# Patient Record
Sex: MALE | Race: WHITE | NOT HISPANIC OR LATINO | URBAN - METROPOLITAN AREA
[De-identification: names, ages, dates, MRNs, and addresses within clinical notes are randomized per-mention and may not be internally consistent; named-entity substitution may affect disease eponyms.]

---

## 2017-10-10 ENCOUNTER — APPOINTMENT (RX ONLY)
Dept: URBAN - METROPOLITAN AREA CLINIC 349 | Facility: CLINIC | Age: 82
Setting detail: DERMATOLOGY
End: 2017-10-10

## 2017-10-10 DIAGNOSIS — L82.0 INFLAMED SEBORRHEIC KERATOSIS: ICD-10-CM

## 2017-10-10 DIAGNOSIS — L30.9 DERMATITIS, UNSPECIFIED: ICD-10-CM

## 2017-10-10 PROBLEM — M12.9 ARTHROPATHY, UNSPECIFIED: Status: ACTIVE | Noted: 2017-10-10

## 2017-10-10 PROBLEM — C43.4 MALIGNANT MELANOMA OF SCALP AND NECK: Status: ACTIVE | Noted: 2017-10-10

## 2017-10-10 PROBLEM — I10 ESSENTIAL (PRIMARY) HYPERTENSION: Status: ACTIVE | Noted: 2017-10-10

## 2017-10-10 PROCEDURE — 11100: CPT | Mod: 59

## 2017-10-10 PROCEDURE — A4550 SURGICAL TRAYS: HCPCS

## 2017-10-10 PROCEDURE — ? SHAVE REMOVAL

## 2017-10-10 PROCEDURE — 11301 SHAVE SKIN LESION 0.6-1.0 CM: CPT

## 2017-10-10 PROCEDURE — ? PRESCRIPTION

## 2017-10-10 PROCEDURE — ? COUNSELING

## 2017-10-10 PROCEDURE — 99213 OFFICE O/P EST LOW 20 MIN: CPT | Mod: 25

## 2017-10-10 PROCEDURE — ? BIOPSY BY PUNCH METHOD

## 2017-10-10 RX ORDER — FLUTICASONE PROPIONATE 0.05 MG/G
OINTMENT TOPICAL
Qty: 1 | Refills: 0 | Status: ERX | COMMUNITY
Start: 2017-10-10

## 2017-10-10 RX ORDER — TRIAMCINOLONE ACETONIDE 1 MG/G
CREAM TOPICAL
Qty: 1 | Refills: 2 | Status: ERX

## 2017-10-10 RX ADMIN — FLUTICASONE PROPIONATE: 0.05 OINTMENT TOPICAL at 00:00

## 2017-10-10 ASSESSMENT — LOCATION DETAILED DESCRIPTION DERM
LOCATION DETAILED: RIGHT LATERAL TRAPEZIAL NECK
LOCATION DETAILED: LEFT INFERIOR UPPER BACK
LOCATION DETAILED: LEFT SUPERIOR MEDIAL MIDBACK

## 2017-10-10 ASSESSMENT — LOCATION SIMPLE DESCRIPTION DERM
LOCATION SIMPLE: POSTERIOR NECK
LOCATION SIMPLE: LEFT LOWER BACK
LOCATION SIMPLE: LEFT UPPER BACK

## 2017-10-10 ASSESSMENT — LOCATION ZONE DERM
LOCATION ZONE: TRUNK
LOCATION ZONE: NECK

## 2017-10-10 NOTE — PROCEDURE: BIOPSY BY PUNCH METHOD
Epidermal Sutures: 5-0 Nylon
Hemostasis: None
Detail Level: Detailed
Anesthesia Type: 1% lidocaine with epinephrine
Billing Type: Third-Party Bill
X Size Of Lesion In Cm (Optional): 0
Bill For Surgical Tray: yes
Patient Will Remove Sutures At Home?: No
Consent: Written consent was obtained and risks were reviewed including but not limited to scarring, infection, bleeding, scabbing, incomplete removal, nerve damage and allergy to anesthesia.
Suture Removal: 14 days
Dressing: bandage
Punch Size In Mm: 4
Deep Sutures: 3-0 Vicryl
Additional Anesthesia Type: 0.5% lidocaine with 1:200,000 epinephrine
Notification Instructions: Patient will be notified of biopsy results. However, patient instructed to call the office if not contacted within 2 weeks.
Anesthesia Volume In Cc (Will Not Render If 0): 1
Biopsy Type: H and E
Accession #: [email or web address]
Post-Care Instructions: I reviewed with the patient in detail post-care instructions. Patient is to keep the biopsy site dry overnight, and then apply bacitracin twice daily until healed. Patient may apply hydrogen peroxide soaks to remove any crusting.
Wound Care: Vaseline

## 2017-10-10 NOTE — PROCEDURE: SHAVE REMOVAL
Anesthesia Type: 2% lidocaine with epinephrine
Medical Necessity Clause: This procedure was medically necessary because the lesion that was treated was: changing
X Size Of Lesion In Cm (Optional): 0
Wound Care: Vaseline
Accession #: [email or web address] read
Biopsy Method: Dermablade
Billing Type: Third-Party Bill
Consent was obtained from the patient. The risks and benefits to therapy were discussed in detail. Specifically, the risks of infection, scarring, bleeding, prolonged wound healing, incomplete removal, allergy to anesthesia, nerve injury and recurrence were addressed. Prior to the procedure, the treatment site was clearly identified and confirmed by the patient. All components of Universal Protocol/PAUSE Rule completed.
Bill For Surgical Tray: yes
Medical Necessity Information: It is in your best interest to select a reason for this procedure from the list below. All of these items fulfill various CMS LCD requirements except the new and changing color options.
Anesthesia Volume In Cc: 0.5
Detail Level: Detailed
Size Of Lesion In Cm (Required): 0.8
Post-Care Instructions: I reviewed with the patient in detail post-care instructions. Patient is to keep the biopsy site dry overnight, and then apply vaseline twice daily until healed. Patient may apply hydrogen peroxide soaks to remove any crusting. After the procedure, the patient was observed for 5-10 minutes and was oriented to person, place and time and demied feeling dizzy, queasy, and stated that they did not feel that they were going to faint.
Bill 23271 For Specimen Handling/Conveyance To Laboratory?: no
Size Of Margin In Cm (Margins Are Not Added To Billing Dimensions): -
Hemostasis: Electrocautery
Path Notes (To The Dermatopathologist): Please check margins.
Notification Instructions: Patient will be notified of biopsy results. However, patient instructed to call the office if not contacted within 2 weeks.

## 2017-10-24 ENCOUNTER — APPOINTMENT (RX ONLY)
Dept: URBAN - METROPOLITAN AREA CLINIC 349 | Facility: CLINIC | Age: 82
Setting detail: DERMATOLOGY
End: 2017-10-24

## 2017-10-24 ENCOUNTER — RX ONLY (OUTPATIENT)
Age: 82
Setting detail: RX ONLY
End: 2017-10-24

## 2017-10-24 DIAGNOSIS — L30.9 DERMATITIS, UNSPECIFIED: ICD-10-CM | Status: IMPROVED

## 2017-10-24 DIAGNOSIS — Z85.820 PERSONAL HISTORY OF MALIGNANT MELANOMA OF SKIN: ICD-10-CM

## 2017-10-24 PROCEDURE — ? PRESCRIPTION

## 2017-10-24 PROCEDURE — 99213 OFFICE O/P EST LOW 20 MIN: CPT

## 2017-10-24 PROCEDURE — ? SUTURE REMOVAL (GLOBAL PERIOD)

## 2017-10-24 PROCEDURE — ? COUNSELING

## 2017-10-24 PROCEDURE — 99024 POSTOP FOLLOW-UP VISIT: CPT

## 2017-10-24 RX ORDER — FLUTICASONE PROPIONATE 0.05 MG/G
OINTMENT TOPICAL
Qty: 1 | Refills: 0 | Status: ERX

## 2017-10-24 RX ORDER — TRIAMCINOLONE ACETONIDE 1 MG/G
CREAM TOPICAL
Qty: 1 | Refills: 2 | Status: ERX

## 2017-10-24 ASSESSMENT — LOCATION SIMPLE DESCRIPTION DERM
LOCATION SIMPLE: RIGHT FOREHEAD
LOCATION SIMPLE: RIGHT SHOULDER
LOCATION SIMPLE: RIGHT SCALP

## 2017-10-24 ASSESSMENT — LOCATION ZONE DERM
LOCATION ZONE: SCALP
LOCATION ZONE: ARM
LOCATION ZONE: FACE

## 2017-10-24 ASSESSMENT — LOCATION DETAILED DESCRIPTION DERM
LOCATION DETAILED: RIGHT CENTRAL FRONTAL SCALP
LOCATION DETAILED: RIGHT SUPERIOR FOREHEAD
LOCATION DETAILED: RIGHT POSTERIOR SHOULDER

## 2017-10-24 NOTE — PROCEDURE: REASSURANCE
Quality 224: Stage 0-Iic Melanoma: Overutilization Of Imaging Studies For Only Stage 0-Iic Melanoma: None of the following diagnostic imaging studies ordered: chest X-ray, CT, Ultrasound, MRI, PET, or nuclear medicine scans (ML)
Quality 137: Melanoma: Continuity Of Care - Recall System: Recall system not utilized, reason not otherwise specified
Detail Level: Zone

## 2017-10-24 NOTE — PROCEDURE: COUNSELING
Quality 137: Melanoma: Continuity Of Care - Recall System: Recall system not utilized, reason not otherwise specified
Detail Level: Generalized
Quality 224: Stage 0-Iic Melanoma: Overutilization Of Imaging Studies For Only Stage 0-Iic Melanoma: None of the following diagnostic imaging studies ordered: chest X-ray, CT, Ultrasound, MRI, PET, or nuclear medicine scans (ML)

## 2017-10-24 NOTE — PROCEDURE: SUTURE REMOVAL (GLOBAL PERIOD)
Add 84123 Cpt? (Important Note: In 2017 The Use Of 29674 Is Being Tracked By Cms To Determine Future Global Period Reimbursement For Global Periods): yes
Detail Level: Detailed

## 2017-12-04 ENCOUNTER — APPOINTMENT (RX ONLY)
Dept: URBAN - METROPOLITAN AREA CLINIC 349 | Facility: CLINIC | Age: 82
Setting detail: DERMATOLOGY
End: 2017-12-04

## 2017-12-04 DIAGNOSIS — L30.9 DERMATITIS, UNSPECIFIED: ICD-10-CM | Status: INADEQUATELY CONTROLLED

## 2017-12-04 DIAGNOSIS — Z85.820 PERSONAL HISTORY OF MALIGNANT MELANOMA OF SKIN: ICD-10-CM

## 2017-12-04 PROCEDURE — ? COUNSELING

## 2017-12-04 PROCEDURE — 99213 OFFICE O/P EST LOW 20 MIN: CPT | Mod: 25

## 2017-12-04 PROCEDURE — ? OTHER

## 2017-12-04 PROCEDURE — ? PRESCRIPTION

## 2017-12-04 PROCEDURE — ? INTRAMUSCULAR KENALOG

## 2017-12-04 PROCEDURE — 96372 THER/PROPH/DIAG INJ SC/IM: CPT

## 2017-12-04 RX ORDER — TRIAMCINOLONE ACETONIDE 1 MG/G
CREAM TOPICAL
Qty: 1 | Refills: 2 | Status: ERX

## 2017-12-04 RX ORDER — FLUTICASONE PROPIONATE 0.05 MG/G
OINTMENT TOPICAL
Qty: 1 | Refills: 0 | Status: ERX

## 2017-12-04 ASSESSMENT — LOCATION ZONE DERM
LOCATION ZONE: SCALP
LOCATION ZONE: TRUNK

## 2017-12-04 ASSESSMENT — LOCATION DETAILED DESCRIPTION DERM
LOCATION DETAILED: RIGHT CENTRAL FRONTAL SCALP
LOCATION DETAILED: RIGHT BUTTOCK

## 2017-12-04 ASSESSMENT — LOCATION SIMPLE DESCRIPTION DERM
LOCATION SIMPLE: RIGHT BUTTOCK
LOCATION SIMPLE: RIGHT SCALP

## 2017-12-04 NOTE — PROCEDURE: OTHER
Other (Free Text): Sampled Aveeno blue label ointment and Cetaphil Restoreaderm wash
Detail Level: Zone
Note Text (......Xxx Chief Complaint.): This diagnosis correlates with the

## 2017-12-04 NOTE — PROCEDURE: REASSURANCE
Detail Level: Detailed
Quality 224: Stage 0-Iic Melanoma: Overutilization Of Imaging Studies For Only Stage 0-Iic Melanoma: None of the following diagnostic imaging studies ordered: chest X-ray, CT, Ultrasound, MRI, PET, or nuclear medicine scans (ML)
Quality 137: Melanoma: Continuity Of Care - Recall System: Recall system not utilized, reason not otherwise specified

## 2017-12-04 NOTE — PROCEDURE: COUNSELING
Detail Level: Zone
Quality 137: Melanoma: Continuity Of Care - Recall System: Recall system not utilized, reason not otherwise specified
Detail Level: Generalized

## 2017-12-07 PROBLEM — L50.8 CHRONIC URTICARIA: Status: ACTIVE | Noted: 2017-12-07

## 2018-01-05 ENCOUNTER — APPOINTMENT (RX ONLY)
Dept: URBAN - METROPOLITAN AREA CLINIC 349 | Facility: CLINIC | Age: 83
Setting detail: DERMATOLOGY
End: 2018-01-05

## 2018-01-05 DIAGNOSIS — L30.9 DERMATITIS, UNSPECIFIED: ICD-10-CM | Status: STABLE

## 2018-01-05 DIAGNOSIS — Z85.820 PERSONAL HISTORY OF MALIGNANT MELANOMA OF SKIN: ICD-10-CM

## 2018-01-05 PROBLEM — I10 ESSENTIAL (PRIMARY) HYPERTENSION: Status: ACTIVE | Noted: 2018-01-05

## 2018-01-05 PROBLEM — M12.9 ARTHROPATHY, UNSPECIFIED: Status: ACTIVE | Noted: 2018-01-05

## 2018-01-05 PROCEDURE — 99214 OFFICE O/P EST MOD 30 MIN: CPT | Mod: 25

## 2018-01-05 PROCEDURE — ? OTHER

## 2018-01-05 PROCEDURE — ? INTRAMUSCULAR KENALOG

## 2018-01-05 PROCEDURE — ? COUNSELING

## 2018-01-05 PROCEDURE — ? RECOMMENDATIONS

## 2018-01-05 PROCEDURE — 96372 THER/PROPH/DIAG INJ SC/IM: CPT

## 2018-01-05 ASSESSMENT — LOCATION DETAILED DESCRIPTION DERM
LOCATION DETAILED: RIGHT BUTTOCK
LOCATION DETAILED: INFERIOR THORACIC SPINE
LOCATION DETAILED: LEFT ANTERIOR PROXIMAL UPPER ARM
LOCATION DETAILED: RIGHT ANTERIOR PROXIMAL THIGH
LOCATION DETAILED: RIGHT CENTRAL FRONTAL SCALP
LOCATION DETAILED: PERIUMBILICAL SKIN
LOCATION DETAILED: RIGHT ANTERIOR PROXIMAL UPPER ARM
LOCATION DETAILED: LEFT INFERIOR LATERAL LOWER BACK
LOCATION DETAILED: LEFT ANTERIOR PROXIMAL THIGH

## 2018-01-05 ASSESSMENT — LOCATION SIMPLE DESCRIPTION DERM
LOCATION SIMPLE: ABDOMEN
LOCATION SIMPLE: LEFT THIGH
LOCATION SIMPLE: RIGHT SCALP
LOCATION SIMPLE: UPPER BACK
LOCATION SIMPLE: RIGHT BUTTOCK
LOCATION SIMPLE: LEFT UPPER ARM
LOCATION SIMPLE: LEFT LOWER BACK
LOCATION SIMPLE: RIGHT THIGH
LOCATION SIMPLE: RIGHT UPPER ARM

## 2018-01-05 ASSESSMENT — LOCATION ZONE DERM
LOCATION ZONE: SCALP
LOCATION ZONE: ARM
LOCATION ZONE: LEG
LOCATION ZONE: TRUNK

## 2018-01-05 NOTE — PROCEDURE: REASSURANCE
Quality 137: Melanoma: Continuity Of Care - Recall System: Recall system not utilized, reason not otherwise specified
Detail Level: Detailed
Quality 224: Stage 0-Iic Melanoma: Overutilization Of Imaging Studies For Only Stage 0-Iic Melanoma: None of the following diagnostic imaging studies ordered: chest X-ray, CT, Ultrasound, MRI, PET, or nuclear medicine scans (ML)

## 2018-01-05 NOTE — PROCEDURE: RECOMMENDATIONS
Detail Level: Zone
Recommendations (Free Text): Continue triamcinolone cream twice daily to affected area \\nFluticasone ointment to worst patches.

## 2018-03-05 ENCOUNTER — APPOINTMENT (RX ONLY)
Dept: URBAN - METROPOLITAN AREA CLINIC 349 | Facility: CLINIC | Age: 83
Setting detail: DERMATOLOGY
End: 2018-03-05

## 2018-03-05 DIAGNOSIS — Z85.820 PERSONAL HISTORY OF MALIGNANT MELANOMA OF SKIN: ICD-10-CM

## 2018-03-05 DIAGNOSIS — D18.0 HEMANGIOMA: ICD-10-CM

## 2018-03-05 DIAGNOSIS — L30.9 DERMATITIS, UNSPECIFIED: ICD-10-CM | Status: UNCHANGED

## 2018-03-05 DIAGNOSIS — L82.1 OTHER SEBORRHEIC KERATOSIS: ICD-10-CM

## 2018-03-05 PROBLEM — Z85.46 PERSONAL HISTORY OF MALIGNANT NEOPLASM OF PROSTATE: Status: ACTIVE | Noted: 2018-03-05

## 2018-03-05 PROBLEM — H91.90 UNSPECIFIED HEARING LOSS, UNSPECIFIED EAR: Status: ACTIVE | Noted: 2018-03-05

## 2018-03-05 PROBLEM — L85.3 XEROSIS CUTIS: Status: ACTIVE | Noted: 2018-03-05

## 2018-03-05 PROBLEM — D18.01 HEMANGIOMA OF SKIN AND SUBCUTANEOUS TISSUE: Status: ACTIVE | Noted: 2018-03-05

## 2018-03-05 PROCEDURE — ? TREATMENT REGIMEN

## 2018-03-05 PROCEDURE — ? COUNSELING

## 2018-03-05 PROCEDURE — ? INTRAMUSCULAR KENALOG

## 2018-03-05 PROCEDURE — 99214 OFFICE O/P EST MOD 30 MIN: CPT | Mod: 25

## 2018-03-05 PROCEDURE — 96372 THER/PROPH/DIAG INJ SC/IM: CPT

## 2018-03-05 ASSESSMENT — PAIN INTENSITY VAS: HOW INTENSE IS YOUR PAIN 0 BEING NO PAIN, 10 BEING THE MOST SEVERE PAIN POSSIBLE?: NO PAIN

## 2018-03-05 ASSESSMENT — LOCATION DETAILED DESCRIPTION DERM
LOCATION DETAILED: LEFT SUPERIOR UPPER BACK
LOCATION DETAILED: RIGHT LATERAL FOREHEAD
LOCATION DETAILED: LEFT INFERIOR MEDIAL MIDBACK
LOCATION DETAILED: LEFT MEDIAL INFERIOR CHEST
LOCATION DETAILED: LEFT INFERIOR CENTRAL MALAR CHEEK
LOCATION DETAILED: RIGHT CENTRAL FRONTAL SCALP
LOCATION DETAILED: RIGHT LATERAL BUTTOCK
LOCATION DETAILED: LEFT MEDIAL SUPERIOR CHEST
LOCATION DETAILED: PERIUMBILICAL SKIN
LOCATION DETAILED: LEFT INFERIOR LATERAL FOREHEAD

## 2018-03-05 ASSESSMENT — LOCATION ZONE DERM
LOCATION ZONE: FACE
LOCATION ZONE: TRUNK
LOCATION ZONE: SCALP

## 2018-03-05 ASSESSMENT — LOCATION SIMPLE DESCRIPTION DERM
LOCATION SIMPLE: ABDOMEN
LOCATION SIMPLE: RIGHT SCALP
LOCATION SIMPLE: RIGHT FOREHEAD
LOCATION SIMPLE: RIGHT BUTTOCK
LOCATION SIMPLE: LEFT CHEEK
LOCATION SIMPLE: LEFT FOREHEAD
LOCATION SIMPLE: LEFT LOWER BACK
LOCATION SIMPLE: CHEST
LOCATION SIMPLE: LEFT UPPER BACK

## 2018-03-05 NOTE — PROCEDURE: REASSURANCE
Quality 224: Stage 0-Iic Melanoma: Overutilization Of Imaging Studies For Only Stage 0-Iic Melanoma: None of the following diagnostic imaging studies ordered: chest X-ray, CT, Ultrasound, MRI, PET, or nuclear medicine scans (ML)
Quality 137: Melanoma: Continuity Of Care - Recall System: Recall system not utilized, reason not otherwise specified
Detail Level: Detailed

## 2018-05-07 ENCOUNTER — APPOINTMENT (RX ONLY)
Dept: URBAN - METROPOLITAN AREA CLINIC 349 | Facility: CLINIC | Age: 83
Setting detail: DERMATOLOGY
End: 2018-05-07

## 2018-05-07 DIAGNOSIS — L30.9 DERMATITIS, UNSPECIFIED: ICD-10-CM | Status: INADEQUATELY CONTROLLED

## 2018-05-07 DIAGNOSIS — L82.1 OTHER SEBORRHEIC KERATOSIS: ICD-10-CM

## 2018-05-07 DIAGNOSIS — D18.0 HEMANGIOMA: ICD-10-CM

## 2018-05-07 DIAGNOSIS — Z85.820 PERSONAL HISTORY OF MALIGNANT MELANOMA OF SKIN: ICD-10-CM

## 2018-05-07 DIAGNOSIS — L82.0 INFLAMED SEBORRHEIC KERATOSIS: ICD-10-CM

## 2018-05-07 PROBLEM — D18.01 HEMANGIOMA OF SKIN AND SUBCUTANEOUS TISSUE: Status: ACTIVE | Noted: 2018-05-07

## 2018-05-07 PROBLEM — C44.311 BASAL CELL CARCINOMA OF SKIN OF NOSE: Status: ACTIVE | Noted: 2018-05-07

## 2018-05-07 PROCEDURE — 17281 DSTR MAL LS F/E/E/N/L/M .6-1: CPT | Mod: 59,76

## 2018-05-07 PROCEDURE — ? PATHOLOGY BILLING

## 2018-05-07 PROCEDURE — ? COUNSELING

## 2018-05-07 PROCEDURE — 17281 DSTR MAL LS F/E/E/N/L/M .6-1: CPT | Mod: 59

## 2018-05-07 PROCEDURE — ? BENIGN DESTRUCTION

## 2018-05-07 PROCEDURE — ? LIQUID NITROGEN

## 2018-05-07 PROCEDURE — A4550 SURGICAL TRAYS: HCPCS

## 2018-05-07 PROCEDURE — ? TREATMENT REGIMEN

## 2018-05-07 PROCEDURE — 88305 TISSUE EXAM BY PATHOLOGIST: CPT

## 2018-05-07 PROCEDURE — 17110 DESTRUCTION B9 LES UP TO 14: CPT

## 2018-05-07 PROCEDURE — ? INTRAMUSCULAR KENALOG

## 2018-05-07 PROCEDURE — 96372 THER/PROPH/DIAG INJ SC/IM: CPT | Mod: 59

## 2018-05-07 PROCEDURE — ? OTHER

## 2018-05-07 PROCEDURE — ? SHAVE REMOVAL AND DESTRUCTION

## 2018-05-07 ASSESSMENT — LOCATION DETAILED DESCRIPTION DERM
LOCATION DETAILED: RIGHT CENTRAL EYEBROW
LOCATION DETAILED: RIGHT CENTRAL EYEBROW
LOCATION DETAILED: RIGHT FOREHEAD
LOCATION DETAILED: NASAL DORSUM
LOCATION DETAILED: RIGHT CENTRAL FRONTAL SCALP
LOCATION DETAILED: LEFT MEDIAL INFERIOR CHEST
LOCATION DETAILED: RIGHT LATERAL SUPERIOR TARSAL REGION
LOCATION DETAILED: LEFT MEDIAL SUPERIOR CHEST
LOCATION DETAILED: LEFT INFERIOR MEDIAL MIDBACK
LOCATION DETAILED: LEFT INFERIOR CENTRAL MALAR CHEEK
LOCATION DETAILED: RIGHT LATERAL FOREHEAD
LOCATION DETAILED: LEFT INFERIOR LATERAL FOREHEAD
LOCATION DETAILED: LEFT CENTRAL FRONTAL SCALP
LOCATION DETAILED: LEFT BUTTOCK
LOCATION DETAILED: RIGHT FOREHEAD
LOCATION DETAILED: PERIUMBILICAL SKIN
LOCATION DETAILED: LEFT SUPERIOR UPPER BACK
LOCATION DETAILED: LEFT LATERAL FOREHEAD

## 2018-05-07 ASSESSMENT — LOCATION SIMPLE DESCRIPTION DERM
LOCATION SIMPLE: NOSE
LOCATION SIMPLE: LEFT LOWER BACK
LOCATION SIMPLE: LEFT SCALP
LOCATION SIMPLE: RIGHT EYEBROW
LOCATION SIMPLE: ABDOMEN
LOCATION SIMPLE: RIGHT FOREHEAD
LOCATION SIMPLE: RIGHT SCALP
LOCATION SIMPLE: LEFT BUTTOCK
LOCATION SIMPLE: CHEST
LOCATION SIMPLE: LEFT CHEEK
LOCATION SIMPLE: RIGHT LATERAL SUPERIOR TARSAL REGION
LOCATION SIMPLE: RIGHT FOREHEAD
LOCATION SIMPLE: RIGHT EYEBROW
LOCATION SIMPLE: LEFT UPPER BACK
LOCATION SIMPLE: LEFT FOREHEAD

## 2018-05-07 ASSESSMENT — LOCATION ZONE DERM
LOCATION ZONE: FACE
LOCATION ZONE: EYELID
LOCATION ZONE: TRUNK
LOCATION ZONE: NOSE
LOCATION ZONE: SCALP
LOCATION ZONE: FACE

## 2018-05-07 NOTE — PROCEDURE: SHAVE REMOVAL AND DESTRUCTION
Consent: Written consent was obtained and risks were reviewed including but not limited to scarring, infection, bleeding, scabbing, incomplete removal, nerve damage and allergy to anesthesia.
Bill For Surgical Tray: yes
Bill As?: Note: Bill Malignant Destruction If Path Confirms Malignant Lesion. Only Bill As Shave Removal If Path Comes Back Benign. Do Not Bill Shave Removal On Malignant Lesions.: Malignant Destruction
Bill 94873 For Specimen Handling/Conveyance To Laboratory?: no
Number Of Curettages: 2
Dressing: Band-Aid
Accession #: [email or web address]
Size After Destruction (Required For Destruction Billing): 0.8
Detail Level: Simple
Anesthesia Type: 1% lidocaine with epinephrine
Post-Care Instructions: I reviewed with the patient in detail post-care instructions. Patient is to keep the biopsy site dry overnight, and then apply bacitracin twice daily until healed. Patient may apply hydrogen peroxide soaks to remove any crusting.  After the procedure, the patient was observed for 5-10 minutes and was oriented to,person, place and time and denied feeling dizzy, queasy and stated that they were not going to faint
Notification Instructions: Patient will be notified of biopsy results. However, patient instructed to call the office if not contacted within 2 weeks.
Cautery Type: electrodesiccation
Anesthesia Volume In Cc: 0.3
Wound Care: Vaseline
Size Of Lesion In Cm: 0
Size After Destruction (Required For Destruction Billing): 0.9
Billing Type: Third-Party Bill
Hemostasis: Electrocautery

## 2018-05-07 NOTE — PROCEDURE: LIQUID NITROGEN
Add 52 Modifier (Optional): no
Medical Necessity Clause: This procedure was medically necessary because the lesions that were treated were:
Detail Level: Detailed
Consent: The patient's consent was obtained including but not limited to risks of crusting, scabbing, blistering, scarring, darker or lighter pigmentary change, recurrence, incomplete removal and infection.
Post-Care Instructions: I reviewed with the patient in detail post-care instructions. Patient is to wear sunprotection, and avoid picking at any of the treated lesions. Pt may apply Vaseline to crusted or scabbing areas.
Number Of Freeze-Thaw Cycles: 1 freeze-thaw cycle
Include Z78.9 (Other Specified Conditions Influencing Health Status) As An Associated Diagnosis?: Yes
Medical Necessity Information: It is in your best interest to select a reason for this procedure from the list below. All of these items fulfill various CMS LCD requirements except the new and changing color options.

## 2018-05-07 NOTE — PROCEDURE: COUNSELING
Quality 137: Melanoma: Continuity Of Care - Recall System: Recall system not utilized, reason not otherwise specified
Detail Level: Zone
Detail Level: Generalized
Quality 224: Stage 0-Iic Melanoma: Overutilization Of Imaging Studies For Only Stage 0-Iic Melanoma: None of the following diagnostic imaging studies ordered: chest X-ray, CT, Ultrasound, MRI, PET, or nuclear medicine scans (ML)
Detail Level: Detailed

## 2018-05-07 NOTE — PROCEDURE: BENIGN DESTRUCTION
Add 52 Modifier (Optional): no
Treatment Number (Will Not Render If 0): 0
Medical Necessity Clause: This procedure was medically necessary because the lesions that were treated were:
Post-Care Instructions: I reviewed with the patient in detail post-care instructions. Patient is to wear sunprotection, and avoid picking at any of the treated lesions. Pt may apply Vaseline to crusted or scabbing areas.
Consent: The patient's consent was obtained including but not limited to risks of crusting, scabbing, blistering, scarring, darker or lighter pigmentary change, recurrence, incomplete removal and infection.
Include Z78.9 (Other Specified Conditions Influencing Health Status) As An Associated Diagnosis?: Yes
Medical Necessity Information: It is in your best interest to select a reason for this procedure from the list below. All of these items fulfill various CMS LCD requirements except the new and changing color options.
Detail Level: Zone

## 2018-08-06 ENCOUNTER — APPOINTMENT (RX ONLY)
Dept: URBAN - METROPOLITAN AREA CLINIC 349 | Facility: CLINIC | Age: 83
Setting detail: DERMATOLOGY
End: 2018-08-06

## 2018-08-06 DIAGNOSIS — L82.0 INFLAMED SEBORRHEIC KERATOSIS: ICD-10-CM

## 2018-08-06 DIAGNOSIS — L82.1 OTHER SEBORRHEIC KERATOSIS: ICD-10-CM

## 2018-08-06 DIAGNOSIS — D18.0 HEMANGIOMA: ICD-10-CM

## 2018-08-06 DIAGNOSIS — L57.0 ACTINIC KERATOSIS: ICD-10-CM

## 2018-08-06 DIAGNOSIS — L30.9 DERMATITIS, UNSPECIFIED: ICD-10-CM

## 2018-08-06 DIAGNOSIS — Z85.820 PERSONAL HISTORY OF MALIGNANT MELANOMA OF SKIN: ICD-10-CM

## 2018-08-06 PROBLEM — L85.3 XEROSIS CUTIS: Status: ACTIVE | Noted: 2018-08-06

## 2018-08-06 PROBLEM — L20.84 INTRINSIC (ALLERGIC) ECZEMA: Status: ACTIVE | Noted: 2018-08-06

## 2018-08-06 PROBLEM — D18.01 HEMANGIOMA OF SKIN AND SUBCUTANEOUS TISSUE: Status: ACTIVE | Noted: 2018-08-06

## 2018-08-06 PROCEDURE — ? TREATMENT REGIMEN

## 2018-08-06 PROCEDURE — ? LIQUID NITROGEN

## 2018-08-06 PROCEDURE — 99213 OFFICE O/P EST LOW 20 MIN: CPT | Mod: 25

## 2018-08-06 PROCEDURE — 17000 DESTRUCT PREMALG LESION: CPT | Mod: 59

## 2018-08-06 PROCEDURE — ? COUNSELING

## 2018-08-06 PROCEDURE — 96372 THER/PROPH/DIAG INJ SC/IM: CPT | Mod: 59

## 2018-08-06 PROCEDURE — 17110 DESTRUCTION B9 LES UP TO 14: CPT

## 2018-08-06 PROCEDURE — 17003 DESTRUCT PREMALG LES 2-14: CPT

## 2018-08-06 PROCEDURE — ? INTRAMUSCULAR KENALOG

## 2018-08-06 ASSESSMENT — LOCATION SIMPLE DESCRIPTION DERM
LOCATION SIMPLE: LEFT CHEEK
LOCATION SIMPLE: ABDOMEN
LOCATION SIMPLE: RIGHT LOWER BACK
LOCATION SIMPLE: LEFT UPPER BACK
LOCATION SIMPLE: RIGHT UPPER BACK
LOCATION SIMPLE: LEFT UPPER BACK
LOCATION SIMPLE: RIGHT SCALP
LOCATION SIMPLE: LEFT CHEEK
LOCATION SIMPLE: LEFT BUTTOCK
LOCATION SIMPLE: RIGHT FOREHEAD
LOCATION SIMPLE: RIGHT UPPER BACK
LOCATION SIMPLE: LEFT LOWER BACK
LOCATION SIMPLE: CHEST
LOCATION SIMPLE: LEFT FOREHEAD
LOCATION SIMPLE: LEFT LOWER BACK
LOCATION SIMPLE: LEFT SCALP

## 2018-08-06 ASSESSMENT — LOCATION DETAILED DESCRIPTION DERM
LOCATION DETAILED: LEFT BUTTOCK
LOCATION DETAILED: LEFT MEDIAL INFERIOR CHEST
LOCATION DETAILED: LEFT SUPERIOR MEDIAL MIDBACK
LOCATION DETAILED: RIGHT MEDIAL UPPER BACK
LOCATION DETAILED: LEFT MID-UPPER BACK
LOCATION DETAILED: PERIUMBILICAL SKIN
LOCATION DETAILED: LEFT CENTRAL FRONTAL SCALP
LOCATION DETAILED: LEFT LATERAL MALAR CHEEK
LOCATION DETAILED: LEFT MID-UPPER BACK
LOCATION DETAILED: RIGHT MID-UPPER BACK
LOCATION DETAILED: LEFT SUPERIOR CENTRAL MALAR CHEEK
LOCATION DETAILED: LEFT INFERIOR MEDIAL MIDBACK
LOCATION DETAILED: LEFT INFERIOR LATERAL FOREHEAD
LOCATION DETAILED: LEFT SUPERIOR UPPER BACK
LOCATION DETAILED: LEFT INFERIOR CENTRAL MALAR CHEEK
LOCATION DETAILED: LEFT MEDIAL SUPERIOR CHEST
LOCATION DETAILED: RIGHT SUPERIOR LATERAL MIDBACK
LOCATION DETAILED: RIGHT LATERAL UPPER BACK
LOCATION DETAILED: RIGHT LATERAL FOREHEAD
LOCATION DETAILED: RIGHT CENTRAL FRONTAL SCALP
LOCATION DETAILED: LEFT LATERAL MALAR CHEEK

## 2018-08-06 ASSESSMENT — LOCATION ZONE DERM
LOCATION ZONE: FACE
LOCATION ZONE: FACE
LOCATION ZONE: TRUNK
LOCATION ZONE: SCALP
LOCATION ZONE: TRUNK

## 2018-08-06 NOTE — PROCEDURE: COUNSELING
Detail Level: Zone
Detail Level: Generalized
Detail Level: Detailed
Quality 137: Melanoma: Continuity Of Care - Recall System: Recall system not utilized, reason not otherwise specified
Quality 224: Stage 0-Iic Melanoma: Overutilization Of Imaging Studies For Only Stage 0-Iic Melanoma: None of the following diagnostic imaging studies ordered: chest X-ray, CT, Ultrasound, MRI, PET, or nuclear medicine scans (ML)

## 2018-08-06 NOTE — PROCEDURE: LIQUID NITROGEN
Post-Care Instructions: I reviewed with the patient in detail post-care instructions. Patient is to wear sunprotection, and avoid picking at any of the treated lesions. Pt may apply Vaseline to crusted or scabbing areas.
Render Post-Care Instructions In Note?: no
Duration Of Freeze Thaw-Cycle (Seconds): 3
Consent: The patient's consent was obtained including but not limited to risks of crusting, scabbing, blistering, scarring, darker or lighter pigmentary change, recurrence, incomplete removal and infection.
Medical Necessity Clause: This procedure was medically necessary because the lesions that were treated were:
Number Of Freeze-Thaw Cycles: 2 freeze-thaw cycles
Include Z78.9 (Other Specified Conditions Influencing Health Status) As An Associated Diagnosis?: Yes
Medical Necessity Information: It is in your best interest to select a reason for this procedure from the list below. All of these items fulfill various CMS LCD requirements except the new and changing color options.
Detail Level: Zone
Number Of Freeze-Thaw Cycles: 1 freeze-thaw cycle
Detail Level: Detailed

## 2018-11-12 ENCOUNTER — APPOINTMENT (RX ONLY)
Dept: URBAN - METROPOLITAN AREA CLINIC 349 | Facility: CLINIC | Age: 83
Setting detail: DERMATOLOGY
End: 2018-11-12

## 2018-11-12 DIAGNOSIS — L30.9 DERMATITIS, UNSPECIFIED: ICD-10-CM

## 2018-11-12 DIAGNOSIS — L82.1 OTHER SEBORRHEIC KERATOSIS: ICD-10-CM

## 2018-11-12 DIAGNOSIS — L72.8 OTHER FOLLICULAR CYSTS OF THE SKIN AND SUBCUTANEOUS TISSUE: ICD-10-CM

## 2018-11-12 DIAGNOSIS — Z71.89 OTHER SPECIFIED COUNSELING: ICD-10-CM

## 2018-11-12 DIAGNOSIS — Z85.820 PERSONAL HISTORY OF MALIGNANT MELANOMA OF SKIN: ICD-10-CM

## 2018-11-12 DIAGNOSIS — L85.3 XEROSIS CUTIS: ICD-10-CM

## 2018-11-12 DIAGNOSIS — Z85.828 PERSONAL HISTORY OF OTHER MALIGNANT NEOPLASM OF SKIN: ICD-10-CM

## 2018-11-12 PROBLEM — I10 ESSENTIAL (PRIMARY) HYPERTENSION: Status: ACTIVE | Noted: 2018-11-12

## 2018-11-12 PROCEDURE — ? TREATMENT REGIMEN

## 2018-11-12 PROCEDURE — 96372 THER/PROPH/DIAG INJ SC/IM: CPT

## 2018-11-12 PROCEDURE — ? PRESCRIPTION

## 2018-11-12 PROCEDURE — ? INTRAMUSCULAR KENALOG

## 2018-11-12 PROCEDURE — 99214 OFFICE O/P EST MOD 30 MIN: CPT | Mod: 25

## 2018-11-12 PROCEDURE — ? COUNSELING

## 2018-11-12 RX ORDER — TRIAMCINOLONE ACETONIDE 1 MG/G
CREAM TOPICAL
Qty: 1 | Refills: 2 | Status: ERX

## 2018-11-12 ASSESSMENT — LOCATION DETAILED DESCRIPTION DERM
LOCATION DETAILED: RIGHT MEDIAL INFERIOR CHEST
LOCATION DETAILED: LEFT MEDIAL INFERIOR CHEST
LOCATION DETAILED: EPIGASTRIC SKIN
LOCATION DETAILED: RIGHT CENTRAL FRONTAL SCALP
LOCATION DETAILED: STERNUM
LOCATION DETAILED: RIGHT SUPERIOR FOREHEAD
LOCATION DETAILED: LEFT MEDIAL UPPER BACK
LOCATION DETAILED: RIGHT BUTTOCK
LOCATION DETAILED: SUPERIOR THORACIC SPINE
LOCATION DETAILED: LEFT MEDIAL SUPERIOR CHEST
LOCATION DETAILED: LEFT MEDIAL CANTHUS

## 2018-11-12 ASSESSMENT — LOCATION SIMPLE DESCRIPTION DERM
LOCATION SIMPLE: CHEST
LOCATION SIMPLE: RIGHT FOREHEAD
LOCATION SIMPLE: LEFT EYELID
LOCATION SIMPLE: RIGHT SCALP
LOCATION SIMPLE: ABDOMEN
LOCATION SIMPLE: RIGHT BUTTOCK
LOCATION SIMPLE: LEFT UPPER BACK
LOCATION SIMPLE: UPPER BACK

## 2018-11-12 ASSESSMENT — LOCATION ZONE DERM
LOCATION ZONE: TRUNK
LOCATION ZONE: FACE
LOCATION ZONE: SCALP
LOCATION ZONE: EYELID

## 2018-11-12 ASSESSMENT — PAIN INTENSITY VAS: HOW INTENSE IS YOUR PAIN 0 BEING NO PAIN, 10 BEING THE MOST SEVERE PAIN POSSIBLE?: 1/10 PAIN

## 2018-11-12 NOTE — PROCEDURE: COUNSELING
Detail Level: Simple
Detail Level: Generalized
Quality 137: Melanoma: Continuity Of Care - Recall System: Patient information entered into a recall system that includes: target date for the next exam specified AND a process to follow up with patients regarding missed or unscheduled appointments
Detail Level: Detailed
Quality 224: Stage 0-Iic Melanoma: Overutilization Of Imaging Studies For Only Stage 0-Iic Melanoma: None of the following diagnostic imaging studies ordered: chest X-ray, CT, Ultrasound, MRI, PET, or nuclear medicine scans (ML)

## 2019-01-01 ENCOUNTER — HOME CARE VISIT (OUTPATIENT)
Dept: HOSPICE | Facility: HOSPICE | Age: 84
End: 2019-01-01
Payer: MEDICARE

## 2019-01-01 ENCOUNTER — HOME CARE VISIT (OUTPATIENT)
Dept: SCHEDULING | Facility: HOME HEALTH | Age: 84
End: 2019-01-01
Payer: MEDICARE

## 2019-01-01 ENCOUNTER — HOME VISIT (OUTPATIENT)
Dept: HOSPICE | Age: 84
End: 2019-01-01

## 2019-01-01 ENCOUNTER — HOSPICE ADMISSION (OUTPATIENT)
Dept: HOSPICE | Facility: HOSPICE | Age: 84
End: 2019-01-01
Payer: MEDICARE

## 2019-01-01 ENCOUNTER — APPOINTMENT (OUTPATIENT)
Dept: MRI IMAGING | Age: 84
DRG: 054 | End: 2019-01-01
Attending: FAMILY MEDICINE
Payer: MEDICARE

## 2019-01-01 ENCOUNTER — APPOINTMENT (OUTPATIENT)
Dept: CT IMAGING | Age: 84
DRG: 054 | End: 2019-01-01
Attending: EMERGENCY MEDICINE
Payer: MEDICARE

## 2019-01-01 ENCOUNTER — APPOINTMENT (OUTPATIENT)
Dept: GENERAL RADIOLOGY | Age: 84
DRG: 054 | End: 2019-01-01
Attending: EMERGENCY MEDICINE
Payer: MEDICARE

## 2019-01-01 ENCOUNTER — HOSPITAL ENCOUNTER (INPATIENT)
Age: 84
LOS: 4 days | Discharge: HOSPICE | DRG: 054 | End: 2019-07-24
Attending: EMERGENCY MEDICINE | Admitting: FAMILY MEDICINE
Payer: MEDICARE

## 2019-01-01 VITALS — HEART RATE: 76 BPM | RESPIRATION RATE: 18 BRPM | DIASTOLIC BLOOD PRESSURE: 62 MMHG | SYSTOLIC BLOOD PRESSURE: 110 MMHG

## 2019-01-01 VITALS — HEART RATE: 56 BPM | RESPIRATION RATE: 15 BRPM | DIASTOLIC BLOOD PRESSURE: 70 MMHG | SYSTOLIC BLOOD PRESSURE: 122 MMHG

## 2019-01-01 VITALS — RESPIRATION RATE: 16 BRPM | TEMPERATURE: 99.4 F | HEART RATE: 124 BPM

## 2019-01-01 VITALS — DIASTOLIC BLOOD PRESSURE: 74 MMHG | SYSTOLIC BLOOD PRESSURE: 110 MMHG | HEART RATE: 52 BPM

## 2019-01-01 VITALS — SYSTOLIC BLOOD PRESSURE: 110 MMHG | HEART RATE: 50 BPM | DIASTOLIC BLOOD PRESSURE: 70 MMHG | RESPIRATION RATE: 16 BRPM

## 2019-01-01 VITALS — DIASTOLIC BLOOD PRESSURE: 70 MMHG | RESPIRATION RATE: 12 BRPM | HEART RATE: 56 BPM | SYSTOLIC BLOOD PRESSURE: 110 MMHG

## 2019-01-01 VITALS
DIASTOLIC BLOOD PRESSURE: 89 MMHG | SYSTOLIC BLOOD PRESSURE: 159 MMHG | HEIGHT: 70 IN | RESPIRATION RATE: 18 BRPM | HEART RATE: 77 BPM | TEMPERATURE: 97.9 F | BODY MASS INDEX: 23.61 KG/M2 | WEIGHT: 164.9 LBS | OXYGEN SATURATION: 95 %

## 2019-01-01 VITALS
HEART RATE: 84 BPM | WEIGHT: 160 LBS | RESPIRATION RATE: 20 BRPM | DIASTOLIC BLOOD PRESSURE: 80 MMHG | SYSTOLIC BLOOD PRESSURE: 140 MMHG | BODY MASS INDEX: 22.96 KG/M2

## 2019-01-01 VITALS — HEART RATE: 52 BPM | RESPIRATION RATE: 22 BRPM | DIASTOLIC BLOOD PRESSURE: 60 MMHG | SYSTOLIC BLOOD PRESSURE: 112 MMHG

## 2019-01-01 VITALS — DIASTOLIC BLOOD PRESSURE: 62 MMHG | HEART RATE: 64 BPM | SYSTOLIC BLOOD PRESSURE: 120 MMHG | RESPIRATION RATE: 18 BRPM

## 2019-01-01 VITALS
TEMPERATURE: 97.2 F | DIASTOLIC BLOOD PRESSURE: 68 MMHG | HEART RATE: 72 BPM | RESPIRATION RATE: 16 BRPM | SYSTOLIC BLOOD PRESSURE: 116 MMHG | OXYGEN SATURATION: 89 %

## 2019-01-01 VITALS — HEART RATE: 52 BPM | RESPIRATION RATE: 16 BRPM | DIASTOLIC BLOOD PRESSURE: 72 MMHG | SYSTOLIC BLOOD PRESSURE: 130 MMHG

## 2019-01-01 DIAGNOSIS — R41.0 DELIRIUM: ICD-10-CM

## 2019-01-01 DIAGNOSIS — C61 MALIGNANT NEOPLASM OF PROSTATE (HCC): ICD-10-CM

## 2019-01-01 DIAGNOSIS — R45.1 AGITATION REQUIRING SEDATION PROTOCOL: ICD-10-CM

## 2019-01-01 DIAGNOSIS — R90.0 INTRACRANIAL MASS: Primary | ICD-10-CM

## 2019-01-01 DIAGNOSIS — R93.0 ABNORMAL HEAD CT: ICD-10-CM

## 2019-01-01 DIAGNOSIS — C79.31 MALIGNANT MELANOMA METASTATIC TO BRAIN (HCC): ICD-10-CM

## 2019-01-01 DIAGNOSIS — Z71.0 DISCUSSION ABOUT ADVANCE CARE PLANNING HELD WITH FAMILY MEMBER: ICD-10-CM

## 2019-01-01 DIAGNOSIS — C43.9 MALIGNANT MELANOMA OF SKIN (HCC): ICD-10-CM

## 2019-01-01 DIAGNOSIS — R47.01 EXPRESSIVE APHASIA: ICD-10-CM

## 2019-01-01 DIAGNOSIS — R13.19 DYSPHAGIA CAUSING PULMONARY ASPIRATION WITH SWALLOWING: ICD-10-CM

## 2019-01-01 DIAGNOSIS — R53.1 LEFT-SIDED WEAKNESS: ICD-10-CM

## 2019-01-01 DIAGNOSIS — R53.1 WEAKNESS: ICD-10-CM

## 2019-01-01 DIAGNOSIS — G93.6 VASOGENIC BRAIN EDEMA (HCC): ICD-10-CM

## 2019-01-01 DIAGNOSIS — I25.10 ATHEROSCLEROSIS OF NATIVE CORONARY ARTERY WITHOUT ANGINA PECTORIS, UNSPECIFIED WHETHER NATIVE OR TRANSPLANTED HEART: Primary | ICD-10-CM

## 2019-01-01 DIAGNOSIS — C43.4 MALIGNANT MELANOMA OF SCALP (HCC): ICD-10-CM

## 2019-01-01 DIAGNOSIS — G93.6 BRAIN EDEMA (HCC): ICD-10-CM

## 2019-01-01 DIAGNOSIS — Z51.5 ENCOUNTER FOR PALLIATIVE CARE: ICD-10-CM

## 2019-01-01 DIAGNOSIS — G93.9 BRAIN STEM LESION: ICD-10-CM

## 2019-01-01 LAB
ALBUMIN SERPL-MCNC: 4 G/DL (ref 3.2–4.6)
ALBUMIN/GLOB SERPL: 1.3 {RATIO} (ref 1.2–3.5)
ALP SERPL-CCNC: 94 U/L (ref 50–136)
ALT SERPL-CCNC: 33 U/L (ref 12–65)
ANION GAP SERPL CALC-SCNC: 10 MMOL/L (ref 7–16)
ANION GAP SERPL CALC-SCNC: 11 MMOL/L (ref 7–16)
ANION GAP SERPL CALC-SCNC: 11 MMOL/L (ref 7–16)
AST SERPL-CCNC: 36 U/L (ref 15–37)
ATRIAL RATE: 101 BPM
BASOPHILS # BLD: 0 K/UL (ref 0–0.2)
BASOPHILS NFR BLD: 0 % (ref 0–2)
BILIRUB SERPL-MCNC: 1 MG/DL (ref 0.2–1.1)
BUN SERPL-MCNC: 11 MG/DL (ref 8–23)
BUN SERPL-MCNC: 11 MG/DL (ref 8–23)
BUN SERPL-MCNC: 12 MG/DL (ref 8–23)
BUN SERPL-MCNC: 8 MG/DL (ref 8–23)
BUN SERPL-MCNC: 9 MG/DL (ref 8–23)
CALCIUM SERPL-MCNC: 8.3 MG/DL (ref 8.3–10.4)
CALCIUM SERPL-MCNC: 8.3 MG/DL (ref 8.3–10.4)
CALCIUM SERPL-MCNC: 8.5 MG/DL (ref 8.3–10.4)
CALCIUM SERPL-MCNC: 8.7 MG/DL (ref 8.3–10.4)
CALCIUM SERPL-MCNC: 9.4 MG/DL (ref 8.3–10.4)
CALCULATED P AXIS, ECG09: 54 DEGREES
CALCULATED R AXIS, ECG10: -63 DEGREES
CALCULATED T AXIS, ECG11: 69 DEGREES
CHLORIDE SERPL-SCNC: 103 MMOL/L (ref 98–107)
CHLORIDE SERPL-SCNC: 107 MMOL/L (ref 98–107)
CHLORIDE SERPL-SCNC: 107 MMOL/L (ref 98–107)
CHLORIDE SERPL-SCNC: 108 MMOL/L (ref 98–107)
CHLORIDE SERPL-SCNC: 110 MMOL/L (ref 98–107)
CO2 SERPL-SCNC: 24 MMOL/L (ref 21–32)
CO2 SERPL-SCNC: 26 MMOL/L (ref 21–32)
CO2 SERPL-SCNC: 27 MMOL/L (ref 21–32)
CREAT SERPL-MCNC: 0.75 MG/DL (ref 0.8–1.5)
CREAT SERPL-MCNC: 0.78 MG/DL (ref 0.8–1.5)
CREAT SERPL-MCNC: 0.85 MG/DL (ref 0.8–1.5)
CREAT SERPL-MCNC: 0.88 MG/DL (ref 0.8–1.5)
CREAT SERPL-MCNC: 1.44 MG/DL (ref 0.8–1.5)
DIAGNOSIS, 93000: NORMAL
DIFFERENTIAL METHOD BLD: ABNORMAL
EOSINOPHIL # BLD: 0 K/UL (ref 0–0.8)
EOSINOPHIL # BLD: 0.1 K/UL (ref 0–0.8)
EOSINOPHIL NFR BLD: 0 % (ref 0.5–7.8)
EOSINOPHIL NFR BLD: 1 % (ref 0.5–7.8)
ERYTHROCYTE [DISTWIDTH] IN BLOOD BY AUTOMATED COUNT: 13.2 % (ref 11.9–14.6)
ERYTHROCYTE [DISTWIDTH] IN BLOOD BY AUTOMATED COUNT: 13.3 % (ref 11.9–14.6)
ERYTHROCYTE [DISTWIDTH] IN BLOOD BY AUTOMATED COUNT: 13.3 % (ref 11.9–14.6)
ERYTHROCYTE [DISTWIDTH] IN BLOOD BY AUTOMATED COUNT: 13.4 % (ref 11.9–14.6)
ERYTHROCYTE [DISTWIDTH] IN BLOOD BY AUTOMATED COUNT: 13.5 % (ref 11.9–14.6)
GLOBULIN SER CALC-MCNC: 3.1 G/DL (ref 2.3–3.5)
GLUCOSE SERPL-MCNC: 113 MG/DL (ref 65–100)
GLUCOSE SERPL-MCNC: 146 MG/DL (ref 65–100)
GLUCOSE SERPL-MCNC: 148 MG/DL (ref 65–100)
GLUCOSE SERPL-MCNC: 156 MG/DL (ref 65–100)
GLUCOSE SERPL-MCNC: 207 MG/DL (ref 65–100)
HCT VFR BLD AUTO: 36.9 % (ref 41.1–50.3)
HCT VFR BLD AUTO: 38.1 % (ref 41.1–50.3)
HCT VFR BLD AUTO: 41.1 % (ref 41.1–50.3)
HCT VFR BLD AUTO: 41.8 % (ref 41.1–50.3)
HCT VFR BLD AUTO: 43.8 % (ref 41.1–50.3)
HGB BLD-MCNC: 12.5 G/DL (ref 13.6–17.2)
HGB BLD-MCNC: 13.1 G/DL (ref 13.6–17.2)
HGB BLD-MCNC: 13.9 G/DL (ref 13.6–17.2)
HGB BLD-MCNC: 14.3 G/DL (ref 13.6–17.2)
HGB BLD-MCNC: 15 G/DL (ref 13.6–17.2)
IMM GRANULOCYTES # BLD AUTO: 0 K/UL (ref 0–0.5)
IMM GRANULOCYTES # BLD AUTO: 0 K/UL (ref 0–0.5)
IMM GRANULOCYTES # BLD AUTO: 0.1 K/UL (ref 0–0.5)
IMM GRANULOCYTES NFR BLD AUTO: 0 % (ref 0–5)
IMM GRANULOCYTES NFR BLD AUTO: 0 % (ref 0–5)
IMM GRANULOCYTES NFR BLD AUTO: 1 % (ref 0–5)
INR PPP: 1.1
LYMPHOCYTES # BLD: 0.5 K/UL (ref 0.5–4.6)
LYMPHOCYTES # BLD: 0.5 K/UL (ref 0.5–4.6)
LYMPHOCYTES # BLD: 0.6 K/UL (ref 0.5–4.6)
LYMPHOCYTES # BLD: 0.7 K/UL (ref 0.5–4.6)
LYMPHOCYTES # BLD: 0.7 K/UL (ref 0.5–4.6)
LYMPHOCYTES NFR BLD: 4 % (ref 13–44)
LYMPHOCYTES NFR BLD: 5 % (ref 13–44)
LYMPHOCYTES NFR BLD: 6 % (ref 13–44)
LYMPHOCYTES NFR BLD: 8 % (ref 13–44)
LYMPHOCYTES NFR BLD: 8 % (ref 13–44)
MAGNESIUM SERPL-MCNC: 2 MG/DL (ref 1.8–2.4)
MCH RBC QN AUTO: 30.8 PG (ref 26.1–32.9)
MCH RBC QN AUTO: 31.4 PG (ref 26.1–32.9)
MCH RBC QN AUTO: 31.4 PG (ref 26.1–32.9)
MCH RBC QN AUTO: 31.6 PG (ref 26.1–32.9)
MCH RBC QN AUTO: 31.7 PG (ref 26.1–32.9)
MCHC RBC AUTO-ENTMCNC: 33.3 G/DL (ref 31.4–35)
MCHC RBC AUTO-ENTMCNC: 33.9 G/DL (ref 31.4–35)
MCHC RBC AUTO-ENTMCNC: 34.2 G/DL (ref 31.4–35)
MCHC RBC AUTO-ENTMCNC: 34.4 G/DL (ref 31.4–35)
MCHC RBC AUTO-ENTMCNC: 34.8 G/DL (ref 31.4–35)
MCV RBC AUTO: 90.9 FL (ref 79.6–97.8)
MCV RBC AUTO: 91.1 FL (ref 79.6–97.8)
MCV RBC AUTO: 91.8 FL (ref 79.6–97.8)
MCV RBC AUTO: 91.8 FL (ref 79.6–97.8)
MCV RBC AUTO: 94.4 FL (ref 79.6–97.8)
MM INDURATION POC: 0 MM (ref 0–5)
MM INDURATION POC: NORMAL (ref 0–5)
MONOCYTES # BLD: 0.2 K/UL (ref 0.1–1.3)
MONOCYTES # BLD: 0.4 K/UL (ref 0.1–1.3)
MONOCYTES # BLD: 0.4 K/UL (ref 0.1–1.3)
MONOCYTES # BLD: 0.5 K/UL (ref 0.1–1.3)
MONOCYTES # BLD: 0.6 K/UL (ref 0.1–1.3)
MONOCYTES NFR BLD: 3 % (ref 4–12)
MONOCYTES NFR BLD: 3 % (ref 4–12)
MONOCYTES NFR BLD: 4 % (ref 4–12)
MONOCYTES NFR BLD: 4 % (ref 4–12)
MONOCYTES NFR BLD: 7 % (ref 4–12)
NEUTS SEG # BLD: 10 K/UL (ref 1.7–8.2)
NEUTS SEG # BLD: 11.4 K/UL (ref 1.7–8.2)
NEUTS SEG # BLD: 12.1 K/UL (ref 1.7–8.2)
NEUTS SEG # BLD: 6.4 K/UL (ref 1.7–8.2)
NEUTS SEG # BLD: 8 K/UL (ref 1.7–8.2)
NEUTS SEG NFR BLD: 84 % (ref 43–78)
NEUTS SEG NFR BLD: 89 % (ref 43–78)
NEUTS SEG NFR BLD: 90 % (ref 43–78)
NEUTS SEG NFR BLD: 91 % (ref 43–78)
NEUTS SEG NFR BLD: 92 % (ref 43–78)
NRBC # BLD: 0 K/UL (ref 0–0.2)
P-R INTERVAL, ECG05: 170 MS
PHOSPHATE SERPL-MCNC: 2.3 MG/DL (ref 2.3–3.7)
PLATELET # BLD AUTO: 194 K/UL (ref 150–450)
PLATELET # BLD AUTO: 203 K/UL (ref 150–450)
PLATELET # BLD AUTO: 220 K/UL (ref 150–450)
PLATELET # BLD AUTO: 225 K/UL (ref 150–450)
PLATELET # BLD AUTO: 248 K/UL (ref 150–450)
PMV BLD AUTO: 10 FL (ref 9.4–12.3)
PMV BLD AUTO: 10 FL (ref 9.4–12.3)
PMV BLD AUTO: 10.2 FL (ref 9.4–12.3)
PMV BLD AUTO: 10.3 FL (ref 9.4–12.3)
PMV BLD AUTO: 10.4 FL (ref 9.4–12.3)
POTASSIUM SERPL-SCNC: 3 MMOL/L (ref 3.5–5.1)
POTASSIUM SERPL-SCNC: 3.3 MMOL/L (ref 3.5–5.1)
POTASSIUM SERPL-SCNC: 3.5 MMOL/L (ref 3.5–5.1)
POTASSIUM SERPL-SCNC: 3.7 MMOL/L (ref 3.5–5.1)
POTASSIUM SERPL-SCNC: 3.8 MMOL/L (ref 3.5–5.1)
PPD POC: NEGATIVE NEGATIVE
PPD POC: NORMAL
PROT SERPL-MCNC: 7.1 G/DL (ref 6.3–8.2)
PROTHROMBIN TIME: 13.9 SEC (ref 11.7–14.5)
Q-T INTERVAL, ECG07: 334 MS
QRS DURATION, ECG06: 84 MS
QTC CALCULATION (BEZET), ECG08: 404 MS
RBC # BLD AUTO: 4.06 M/UL (ref 4.23–5.6)
RBC # BLD AUTO: 4.15 M/UL (ref 4.23–5.6)
RBC # BLD AUTO: 4.43 M/UL (ref 4.23–5.6)
RBC # BLD AUTO: 4.51 M/UL (ref 4.23–5.6)
RBC # BLD AUTO: 4.77 M/UL (ref 4.23–5.6)
SODIUM SERPL-SCNC: 141 MMOL/L (ref 136–145)
SODIUM SERPL-SCNC: 142 MMOL/L (ref 136–145)
SODIUM SERPL-SCNC: 143 MMOL/L (ref 136–145)
SODIUM SERPL-SCNC: 144 MMOL/L (ref 136–145)
SODIUM SERPL-SCNC: 146 MMOL/L (ref 136–145)
TSH SERPL DL<=0.005 MIU/L-ACNC: 3.84 UIU/ML (ref 0.36–3.74)
VENTRICULAR RATE, ECG03: 88 BPM
WBC # BLD AUTO: 11 K/UL (ref 4.3–11.1)
WBC # BLD AUTO: 12.6 K/UL (ref 4.3–11.1)
WBC # BLD AUTO: 13.1 K/UL (ref 4.3–11.1)
WBC # BLD AUTO: 7.2 K/UL (ref 4.3–11.1)
WBC # BLD AUTO: 9.5 K/UL (ref 4.3–11.1)

## 2019-01-01 PROCEDURE — G0299 HHS/HOSPICE OF RN EA 15 MIN: HCPCS

## 2019-01-01 PROCEDURE — 0651 HSPC ROUTINE HOME CARE

## 2019-01-01 PROCEDURE — 74011250637 HC RX REV CODE- 250/637: Performed by: FAMILY MEDICINE

## 2019-01-01 PROCEDURE — 93306 TTE W/DOPPLER COMPLETE: CPT

## 2019-01-01 PROCEDURE — 85025 COMPLETE CBC W/AUTO DIFF WBC: CPT

## 2019-01-01 PROCEDURE — G0156 HHCP-SVS OF AIDE,EA 15 MIN: HCPCS

## 2019-01-01 PROCEDURE — 65620000000 HC RM CCU GENERAL

## 2019-01-01 PROCEDURE — 71046 X-RAY EXAM CHEST 2 VIEWS: CPT

## 2019-01-01 PROCEDURE — G0155 HHCP-SVS OF CSW,EA 15 MIN: HCPCS

## 2019-01-01 PROCEDURE — 84100 ASSAY OF PHOSPHORUS: CPT

## 2019-01-01 PROCEDURE — 74011250637 HC RX REV CODE- 250/637: Performed by: INTERNAL MEDICINE

## 2019-01-01 PROCEDURE — HOSPICE MEDICATION HC HH HOSPICE MEDICATION

## 2019-01-01 PROCEDURE — 65270000029 HC RM PRIVATE

## 2019-01-01 PROCEDURE — 74011000250 HC RX REV CODE- 250: Performed by: INTERNAL MEDICINE

## 2019-01-01 PROCEDURE — 80048 BASIC METABOLIC PNL TOTAL CA: CPT

## 2019-01-01 PROCEDURE — 3331090004 HSPC SERVICE INTENSITY ADD-ON

## 2019-01-01 PROCEDURE — 74011250636 HC RX REV CODE- 250/636: Performed by: INTERNAL MEDICINE

## 2019-01-01 PROCEDURE — 74011000250 HC RX REV CODE- 250: Performed by: FAMILY MEDICINE

## 2019-01-01 PROCEDURE — 83735 ASSAY OF MAGNESIUM: CPT

## 2019-01-01 PROCEDURE — 77030020263 HC SOL INJ SOD CL0.9% LFCR 1000ML

## 2019-01-01 PROCEDURE — T4541 LARGE DISPOSABLE UNDERPAD: HCPCS

## 2019-01-01 PROCEDURE — 74011000258 HC RX REV CODE- 258: Performed by: INTERNAL MEDICINE

## 2019-01-01 PROCEDURE — 74011250636 HC RX REV CODE- 250/636: Performed by: FAMILY MEDICINE

## 2019-01-01 PROCEDURE — 77030032490 HC SLV COMPR SCD KNE COVD -B

## 2019-01-01 PROCEDURE — 70553 MRI BRAIN STEM W/O & W/DYE: CPT

## 2019-01-01 PROCEDURE — 97162 PT EVAL MOD COMPLEX 30 MIN: CPT

## 2019-01-01 PROCEDURE — 36415 COLL VENOUS BLD VENIPUNCTURE: CPT

## 2019-01-01 PROCEDURE — 86580 TB INTRADERMAL TEST: CPT | Performed by: INTERNAL MEDICINE

## 2019-01-01 PROCEDURE — 99223 1ST HOSP IP/OBS HIGH 75: CPT | Performed by: INTERNAL MEDICINE

## 2019-01-01 PROCEDURE — 81003 URINALYSIS AUTO W/O SCOPE: CPT | Performed by: EMERGENCY MEDICINE

## 2019-01-01 PROCEDURE — 99497 ADVNCD CARE PLAN 30 MIN: CPT | Performed by: NURSE PRACTITIONER

## 2019-01-01 PROCEDURE — 96374 THER/PROPH/DIAG INJ IV PUSH: CPT | Performed by: EMERGENCY MEDICINE

## 2019-01-01 PROCEDURE — 84443 ASSAY THYROID STIM HORMONE: CPT

## 2019-01-01 PROCEDURE — 97166 OT EVAL MOD COMPLEX 45 MIN: CPT

## 2019-01-01 PROCEDURE — 80053 COMPREHEN METABOLIC PANEL: CPT

## 2019-01-01 PROCEDURE — 92610 EVALUATE SWALLOWING FUNCTION: CPT

## 2019-01-01 PROCEDURE — T4523 ADULT SIZE BRIEF/DIAPER LG: HCPCS

## 2019-01-01 PROCEDURE — 74011000302 HC RX REV CODE- 302: Performed by: INTERNAL MEDICINE

## 2019-01-01 PROCEDURE — 74011250636 HC RX REV CODE- 250/636

## 2019-01-01 PROCEDURE — 99223 1ST HOSP IP/OBS HIGH 75: CPT | Performed by: NURSE PRACTITIONER

## 2019-01-01 PROCEDURE — 3336500001 HSPC ELECTION

## 2019-01-01 PROCEDURE — A6250 SKIN SEAL PROTECT MOISTURIZR: HCPCS

## 2019-01-01 PROCEDURE — 99285 EMERGENCY DEPT VISIT HI MDM: CPT | Performed by: EMERGENCY MEDICINE

## 2019-01-01 PROCEDURE — A9270 NON-COVERED ITEM OR SERVICE: HCPCS

## 2019-01-01 PROCEDURE — 74011250636 HC RX REV CODE- 250/636: Performed by: EMERGENCY MEDICINE

## 2019-01-01 PROCEDURE — 85610 PROTHROMBIN TIME: CPT

## 2019-01-01 PROCEDURE — 93005 ELECTROCARDIOGRAM TRACING: CPT | Performed by: EMERGENCY MEDICINE

## 2019-01-01 PROCEDURE — 77030019605

## 2019-01-01 PROCEDURE — 99233 SBSQ HOSP IP/OBS HIGH 50: CPT | Performed by: NURSE PRACTITIONER

## 2019-01-01 PROCEDURE — 97535 SELF CARE MNGMENT TRAINING: CPT

## 2019-01-01 PROCEDURE — A9575 INJ GADOTERATE MEGLUMI 0.1ML: HCPCS | Performed by: FAMILY MEDICINE

## 2019-01-01 PROCEDURE — 99232 SBSQ HOSP IP/OBS MODERATE 35: CPT | Performed by: INTERNAL MEDICINE

## 2019-01-01 PROCEDURE — 76450000000

## 2019-01-01 PROCEDURE — 70450 CT HEAD/BRAIN W/O DYE: CPT

## 2019-01-01 RX ORDER — SODIUM CHLORIDE 9 MG/ML
75 INJECTION, SOLUTION INTRAVENOUS CONTINUOUS
Status: DISCONTINUED | OUTPATIENT
Start: 2019-01-01 | End: 2019-01-01 | Stop reason: HOSPADM

## 2019-01-01 RX ORDER — LEVETIRACETAM 5 MG/ML
500 INJECTION INTRAVASCULAR EVERY 12 HOURS
Status: DISCONTINUED | OUTPATIENT
Start: 2019-01-01 | End: 2019-01-01 | Stop reason: SDUPTHER

## 2019-01-01 RX ORDER — GADOTERATE MEGLUMINE 376.9 MG/ML
16 INJECTION INTRAVENOUS
Status: COMPLETED | OUTPATIENT
Start: 2019-01-01 | End: 2019-01-01

## 2019-01-01 RX ORDER — OXYCODONE HYDROCHLORIDE 5 MG/1
5 TABLET ORAL
Qty: 15 TAB | Refills: 0 | Status: SHIPPED | OUTPATIENT
Start: 2019-01-01 | End: 2019-01-01

## 2019-01-01 RX ORDER — LORAZEPAM 2 MG/ML
1 INJECTION INTRAMUSCULAR
Status: DISCONTINUED | OUTPATIENT
Start: 2019-01-01 | End: 2019-01-01 | Stop reason: HOSPADM

## 2019-01-01 RX ORDER — SIMVASTATIN 20 MG/1
20 TABLET, FILM COATED ORAL
Status: DISCONTINUED | OUTPATIENT
Start: 2019-01-01 | End: 2019-01-01 | Stop reason: HOSPADM

## 2019-01-01 RX ORDER — ONDANSETRON 2 MG/ML
4 INJECTION INTRAMUSCULAR; INTRAVENOUS
Status: DISCONTINUED | OUTPATIENT
Start: 2019-01-01 | End: 2019-01-01 | Stop reason: HOSPADM

## 2019-01-01 RX ORDER — POTASSIUM CHLORIDE 1.5 G/1.77G
40 POWDER, FOR SOLUTION ORAL
Status: COMPLETED | OUTPATIENT
Start: 2019-01-01 | End: 2019-01-01

## 2019-01-01 RX ORDER — LORATADINE 10 MG/1
10 TABLET ORAL DAILY
Status: DISCONTINUED | OUTPATIENT
Start: 2019-01-01 | End: 2019-01-01

## 2019-01-01 RX ORDER — METHOTREXATE 2.5 MG/1
2.5 TABLET ORAL
Status: DISCONTINUED | OUTPATIENT
Start: 2019-01-01 | End: 2019-01-01

## 2019-01-01 RX ORDER — CARVEDILOL 3.12 MG/1
6.25 TABLET ORAL 2 TIMES DAILY WITH MEALS
Status: CANCELLED | OUTPATIENT
Start: 2019-01-01

## 2019-01-01 RX ORDER — ACETAMINOPHEN 325 MG/1
650 TABLET ORAL
Status: DISCONTINUED | OUTPATIENT
Start: 2019-01-01 | End: 2019-01-01 | Stop reason: HOSPADM

## 2019-01-01 RX ORDER — DEXAMETHASONE SODIUM PHOSPHATE 4 MG/ML
4 INJECTION, SOLUTION INTRA-ARTICULAR; INTRALESIONAL; INTRAMUSCULAR; INTRAVENOUS; SOFT TISSUE EVERY 6 HOURS
Status: DISCONTINUED | OUTPATIENT
Start: 2019-01-01 | End: 2019-01-01

## 2019-01-01 RX ORDER — SODIUM CHLORIDE 0.9 % (FLUSH) 0.9 %
10 SYRINGE (ML) INJECTION
Status: COMPLETED | OUTPATIENT
Start: 2019-01-01 | End: 2019-01-01

## 2019-01-01 RX ORDER — DEXAMETHASONE SODIUM PHOSPHATE 100 MG/10ML
10 INJECTION INTRAMUSCULAR; INTRAVENOUS
Status: COMPLETED | OUTPATIENT
Start: 2019-01-01 | End: 2019-01-01

## 2019-01-01 RX ORDER — SODIUM CHLORIDE 0.9 % (FLUSH) 0.9 %
5-40 SYRINGE (ML) INJECTION EVERY 8 HOURS
Status: DISCONTINUED | OUTPATIENT
Start: 2019-01-01 | End: 2019-01-01 | Stop reason: HOSPADM

## 2019-01-01 RX ORDER — FAMOTIDINE 20 MG/1
20 TABLET, FILM COATED ORAL 2 TIMES DAILY
Status: DISCONTINUED | OUTPATIENT
Start: 2019-01-01 | End: 2019-01-01 | Stop reason: HOSPADM

## 2019-01-01 RX ORDER — DEXAMETHASONE 4 MG/1
4 TABLET ORAL EVERY 6 HOURS
Qty: 120 TAB | Refills: 0 | Status: SHIPPED | OUTPATIENT
Start: 2019-01-01 | End: 2019-01-01

## 2019-01-01 RX ORDER — OXYCODONE HYDROCHLORIDE 5 MG/1
5 TABLET ORAL
Status: DISCONTINUED | OUTPATIENT
Start: 2019-01-01 | End: 2019-01-01 | Stop reason: HOSPADM

## 2019-01-01 RX ORDER — DEXAMETHASONE 4 MG/1
4 TABLET ORAL EVERY 6 HOURS
Status: DISCONTINUED | OUTPATIENT
Start: 2019-01-01 | End: 2019-01-01 | Stop reason: HOSPADM

## 2019-01-01 RX ORDER — CLOPIDOGREL BISULFATE 75 MG/1
75 TABLET ORAL
Status: CANCELLED | OUTPATIENT
Start: 2019-01-01

## 2019-01-01 RX ORDER — LORAZEPAM 2 MG/ML
1 INJECTION INTRAMUSCULAR
Status: DISCONTINUED | OUTPATIENT
Start: 2019-01-01 | End: 2019-01-01

## 2019-01-01 RX ORDER — PANTOPRAZOLE SODIUM 40 MG/1
40 TABLET, DELAYED RELEASE ORAL
Status: DISCONTINUED | OUTPATIENT
Start: 2019-01-01 | End: 2019-01-01

## 2019-01-01 RX ORDER — METOPROLOL TARTRATE 5 MG/5ML
2.5 INJECTION INTRAVENOUS
Status: DISCONTINUED | OUTPATIENT
Start: 2019-01-01 | End: 2019-01-01 | Stop reason: HOSPADM

## 2019-01-01 RX ORDER — HALOPERIDOL 5 MG/ML
INJECTION INTRAMUSCULAR
Status: COMPLETED
Start: 2019-01-01 | End: 2019-01-01

## 2019-01-01 RX ORDER — HALOPERIDOL 5 MG/ML
5 INJECTION INTRAMUSCULAR
Status: DISCONTINUED | OUTPATIENT
Start: 2019-01-01 | End: 2019-01-01 | Stop reason: HOSPADM

## 2019-01-01 RX ORDER — HYDROCHLOROTHIAZIDE 25 MG/1
12.5 TABLET ORAL DAILY
Status: CANCELLED | OUTPATIENT
Start: 2019-01-01

## 2019-01-01 RX ORDER — SODIUM CHLORIDE 0.9 % (FLUSH) 0.9 %
5-40 SYRINGE (ML) INJECTION AS NEEDED
Status: DISCONTINUED | OUTPATIENT
Start: 2019-01-01 | End: 2019-01-01 | Stop reason: HOSPADM

## 2019-01-01 RX ORDER — POTASSIUM CHLORIDE 14.9 MG/ML
20 INJECTION INTRAVENOUS ONCE
Status: COMPLETED | OUTPATIENT
Start: 2019-01-01 | End: 2019-01-01

## 2019-01-01 RX ORDER — LEVETIRACETAM 500 MG/1
500 TABLET ORAL 2 TIMES DAILY
Status: DISCONTINUED | OUTPATIENT
Start: 2019-01-01 | End: 2019-01-01

## 2019-01-01 RX ADMIN — DEXAMETHASONE SODIUM PHOSPHATE 4 MG: 4 INJECTION, SOLUTION INTRAMUSCULAR; INTRAVENOUS at 00:23

## 2019-01-01 RX ADMIN — SIMVASTATIN 20 MG: 20 TABLET, FILM COATED ORAL at 08:33

## 2019-01-01 RX ADMIN — OXYCODONE HYDROCHLORIDE 5 MG: 5 TABLET ORAL at 15:00

## 2019-01-01 RX ADMIN — SIMVASTATIN 20 MG: 20 TABLET, FILM COATED ORAL at 06:29

## 2019-01-01 RX ADMIN — FAMOTIDINE 20 MG: 10 INJECTION, SOLUTION INTRAVENOUS at 08:35

## 2019-01-01 RX ADMIN — SODIUM CHLORIDE 500 MG: 900 INJECTION, SOLUTION INTRAVENOUS at 08:39

## 2019-01-01 RX ADMIN — DEXAMETHASONE SODIUM PHOSPHATE 4 MG: 4 INJECTION, SOLUTION INTRAMUSCULAR; INTRAVENOUS at 00:03

## 2019-01-01 RX ADMIN — OXYCODONE HYDROCHLORIDE 5 MG: 5 TABLET ORAL at 14:05

## 2019-01-01 RX ADMIN — HALOPERIDOL LACTATE 5 MG: 5 INJECTION INTRAMUSCULAR at 20:07

## 2019-01-01 RX ADMIN — LORAZEPAM 1 MG: 2 INJECTION INTRAMUSCULAR; INTRAVENOUS at 18:00

## 2019-01-01 RX ADMIN — DEXAMETHASONE SODIUM PHOSPHATE 4 MG: 4 INJECTION, SOLUTION INTRAMUSCULAR; INTRAVENOUS at 13:51

## 2019-01-01 RX ADMIN — DEXAMETHASONE 4 MG: 4 TABLET ORAL at 00:01

## 2019-01-01 RX ADMIN — Medication 10 ML: at 17:56

## 2019-01-01 RX ADMIN — OXYCODONE HYDROCHLORIDE 5 MG: 5 TABLET ORAL at 16:00

## 2019-01-01 RX ADMIN — HYOSCYAMINE SULFATE 125 MCG: 0.12 LIQUID ORAL at 08:45

## 2019-01-01 RX ADMIN — DEXAMETHASONE 4 MG: 4 TABLET ORAL at 05:42

## 2019-01-01 RX ADMIN — OXYCODONE HYDROCHLORIDE 5 MG: 5 TABLET ORAL at 11:31

## 2019-01-01 RX ADMIN — FAMOTIDINE 20 MG: 20 TABLET ORAL at 09:04

## 2019-01-01 RX ADMIN — HALOPERIDOL LACTATE 5 MG: 5 INJECTION INTRAMUSCULAR at 08:56

## 2019-01-01 RX ADMIN — DEXAMETHASONE SODIUM PHOSPHATE 4 MG: 4 INJECTION, SOLUTION INTRAMUSCULAR; INTRAVENOUS at 11:32

## 2019-01-01 RX ADMIN — HALOPERIDOL LACTATE 5 MG: 5 INJECTION INTRAMUSCULAR at 20:03

## 2019-01-01 RX ADMIN — DEXAMETHASONE SODIUM PHOSPHATE 4 MG: 4 INJECTION, SOLUTION INTRAMUSCULAR; INTRAVENOUS at 17:04

## 2019-01-01 RX ADMIN — SODIUM CHLORIDE 500 MG: 900 INJECTION, SOLUTION INTRAVENOUS at 21:14

## 2019-01-01 RX ADMIN — GADOTERATE MEGLUMINE 16 ML: 376.9 INJECTION INTRAVENOUS at 17:56

## 2019-01-01 RX ADMIN — Medication 1 AMPULE: at 21:38

## 2019-01-01 RX ADMIN — Medication 1 AMPULE: at 20:03

## 2019-01-01 RX ADMIN — POTASSIUM CHLORIDE 40 MEQ: 1.5 POWDER, FOR SOLUTION ORAL at 15:58

## 2019-01-01 RX ADMIN — POTASSIUM CHLORIDE 20 MEQ: 200 INJECTION, SOLUTION INTRAVENOUS at 18:39

## 2019-01-01 RX ADMIN — DEXAMETHASONE SODIUM PHOSPHATE 4 MG: 4 INJECTION, SOLUTION INTRAMUSCULAR; INTRAVENOUS at 18:38

## 2019-01-01 RX ADMIN — SIMVASTATIN 20 MG: 20 TABLET, FILM COATED ORAL at 05:33

## 2019-01-01 RX ADMIN — TUBERCULIN PURIFIED PROTEIN DERIVATIVE 5 UNITS: 5 INJECTION INTRADERMAL at 10:53

## 2019-01-01 RX ADMIN — DEXAMETHASONE 4 MG: 4 TABLET ORAL at 11:31

## 2019-01-01 RX ADMIN — HALOPERIDOL LACTATE 5 MG: 5 INJECTION INTRAMUSCULAR at 02:44

## 2019-01-01 RX ADMIN — Medication 1 AMPULE: at 08:32

## 2019-01-01 RX ADMIN — DEXAMETHASONE 4 MG: 4 TABLET ORAL at 17:21

## 2019-01-01 RX ADMIN — DEXAMETHASONE SODIUM PHOSPHATE 4 MG: 4 INJECTION, SOLUTION INTRAMUSCULAR; INTRAVENOUS at 06:29

## 2019-01-01 RX ADMIN — SODIUM CHLORIDE 500 MG: 900 INJECTION, SOLUTION INTRAVENOUS at 22:09

## 2019-01-01 RX ADMIN — FAMOTIDINE 20 MG: 10 INJECTION, SOLUTION INTRAVENOUS at 08:06

## 2019-01-01 RX ADMIN — DEXAMETHASONE SODIUM PHOSPHATE 4 MG: 4 INJECTION, SOLUTION INTRAMUSCULAR; INTRAVENOUS at 12:29

## 2019-01-01 RX ADMIN — SODIUM CHLORIDE 75 ML/HR: 900 INJECTION, SOLUTION INTRAVENOUS at 09:28

## 2019-01-01 RX ADMIN — Medication 10 ML: at 13:52

## 2019-01-01 RX ADMIN — DEXAMETHASONE SODIUM PHOSPHATE 4 MG: 4 INJECTION, SOLUTION INTRAMUSCULAR; INTRAVENOUS at 23:55

## 2019-01-01 RX ADMIN — DEXAMETHASONE SODIUM PHOSPHATE 4 MG: 4 INJECTION, SOLUTION INTRAMUSCULAR; INTRAVENOUS at 17:16

## 2019-01-01 RX ADMIN — LEVETIRACETAM 500 MG: 100 SOLUTION ORAL at 21:37

## 2019-01-01 RX ADMIN — SODIUM CHLORIDE 75 ML/HR: 900 INJECTION, SOLUTION INTRAVENOUS at 11:35

## 2019-01-01 RX ADMIN — SODIUM CHLORIDE 500 MG: 900 INJECTION, SOLUTION INTRAVENOUS at 20:56

## 2019-01-01 RX ADMIN — FAMOTIDINE 20 MG: 10 INJECTION, SOLUTION INTRAVENOUS at 22:08

## 2019-01-01 RX ADMIN — METOPROLOL TARTRATE 2.5 MG: 5 INJECTION INTRAVENOUS at 05:44

## 2019-01-01 RX ADMIN — Medication 10 ML: at 06:00

## 2019-01-01 RX ADMIN — Medication 5 ML: at 21:38

## 2019-01-01 RX ADMIN — FAMOTIDINE 20 MG: 10 INJECTION, SOLUTION INTRAVENOUS at 20:57

## 2019-01-01 RX ADMIN — LORAZEPAM 1 MG: 2 INJECTION INTRAMUSCULAR; INTRAVENOUS at 16:22

## 2019-01-01 RX ADMIN — DEXAMETHASONE SODIUM PHOSPHATE 4 MG: 4 INJECTION, SOLUTION INTRAMUSCULAR; INTRAVENOUS at 05:33

## 2019-01-01 RX ADMIN — LORATADINE 10 MG: 10 TABLET ORAL at 10:51

## 2019-01-01 RX ADMIN — CARVEDILOL 3.12 MG: 6.25 TABLET ORAL at 08:45

## 2019-01-01 RX ADMIN — Medication 5 ML: at 20:03

## 2019-01-01 RX ADMIN — LEVETIRACETAM 500 MG: 100 SOLUTION ORAL at 09:04

## 2019-01-01 RX ADMIN — SODIUM CHLORIDE 75 ML/HR: 900 INJECTION, SOLUTION INTRAVENOUS at 20:38

## 2019-01-01 RX ADMIN — DEXAMETHASONE SODIUM PHOSPHATE 10 MG: 10 INJECTION INTRAMUSCULAR; INTRAVENOUS at 01:01

## 2019-01-01 RX ADMIN — SIMVASTATIN 20 MG: 20 TABLET, FILM COATED ORAL at 05:42

## 2019-01-01 RX ADMIN — Medication 5 ML: at 05:17

## 2019-01-01 RX ADMIN — DEXAMETHASONE SODIUM PHOSPHATE 4 MG: 4 INJECTION, SOLUTION INTRAMUSCULAR; INTRAVENOUS at 11:52

## 2019-01-01 RX ADMIN — FAMOTIDINE 20 MG: 20 TABLET ORAL at 17:21

## 2019-01-01 RX ADMIN — Medication 5 ML: at 05:43

## 2019-01-01 RX ADMIN — OXYCODONE HYDROCHLORIDE 5 MG: 5 TABLET ORAL at 01:07

## 2019-01-01 RX ADMIN — Medication 10 ML: at 14:38

## 2019-01-01 RX ADMIN — HALOPERIDOL LACTATE 5 MG: 5 INJECTION INTRAMUSCULAR at 22:07

## 2019-01-01 RX ADMIN — Medication 1 AMPULE: at 12:29

## 2019-01-01 RX ADMIN — Medication 5 ML: at 05:35

## 2019-01-01 RX ADMIN — LEVETIRACETAM 500 MG: 500 TABLET ORAL at 10:51

## 2019-01-01 RX ADMIN — SODIUM CHLORIDE 500 MG: 900 INJECTION, SOLUTION INTRAVENOUS at 09:32

## 2019-01-01 RX ADMIN — SODIUM CHLORIDE 75 ML/HR: 900 INJECTION, SOLUTION INTRAVENOUS at 05:31

## 2019-01-01 RX ADMIN — Medication 1 AMPULE: at 09:05

## 2019-01-01 RX ADMIN — FAMOTIDINE 20 MG: 10 INJECTION, SOLUTION INTRAVENOUS at 09:37

## 2019-01-01 RX ADMIN — Medication 10 ML: at 05:44

## 2019-01-01 RX ADMIN — Medication 10 ML: at 17:16

## 2019-01-01 RX ADMIN — MORPHINE SULFATE 10 MG: 20 SOLUTION ORAL at 14:30

## 2019-01-01 RX ADMIN — SODIUM CHLORIDE 500 MG: 900 INJECTION, SOLUTION INTRAVENOUS at 08:06

## 2019-01-01 RX ADMIN — DEXAMETHASONE SODIUM PHOSPHATE 4 MG: 4 INJECTION, SOLUTION INTRAMUSCULAR; INTRAVENOUS at 05:44

## 2019-01-01 RX ADMIN — DEXAMETHASONE SODIUM PHOSPHATE 4 MG: 4 INJECTION, SOLUTION INTRAMUSCULAR; INTRAVENOUS at 06:00

## 2019-01-01 RX ADMIN — PANTOPRAZOLE SODIUM 40 MG: 40 TABLET, DELAYED RELEASE ORAL at 10:51

## 2019-01-01 RX ADMIN — Medication 10 ML: at 21:14

## 2019-02-12 ENCOUNTER — APPOINTMENT (RX ONLY)
Dept: URBAN - METROPOLITAN AREA CLINIC 349 | Facility: CLINIC | Age: 84
Setting detail: DERMATOLOGY
End: 2019-02-12

## 2019-02-12 DIAGNOSIS — L82.1 OTHER SEBORRHEIC KERATOSIS: ICD-10-CM

## 2019-02-12 DIAGNOSIS — L30.9 DERMATITIS, UNSPECIFIED: ICD-10-CM

## 2019-02-12 DIAGNOSIS — L82.0 INFLAMED SEBORRHEIC KERATOSIS: ICD-10-CM

## 2019-02-12 DIAGNOSIS — D18.0 HEMANGIOMA: ICD-10-CM

## 2019-02-12 DIAGNOSIS — Z85.820 PERSONAL HISTORY OF MALIGNANT MELANOMA OF SKIN: ICD-10-CM

## 2019-02-12 DIAGNOSIS — Z85.828 PERSONAL HISTORY OF OTHER MALIGNANT NEOPLASM OF SKIN: ICD-10-CM

## 2019-02-12 PROBLEM — H91.90 UNSPECIFIED HEARING LOSS, UNSPECIFIED EAR: Status: ACTIVE | Noted: 2019-02-12

## 2019-02-12 PROBLEM — D18.01 HEMANGIOMA OF SKIN AND SUBCUTANEOUS TISSUE: Status: ACTIVE | Noted: 2019-02-12

## 2019-02-12 PROBLEM — Z85.46 PERSONAL HISTORY OF MALIGNANT NEOPLASM OF PROSTATE: Status: ACTIVE | Noted: 2019-02-12

## 2019-02-12 PROCEDURE — 17000 DESTRUCT PREMALG LESION: CPT

## 2019-02-12 PROCEDURE — ? COUNSELING

## 2019-02-12 PROCEDURE — ? INTRAMUSCULAR KENALOG

## 2019-02-12 PROCEDURE — ? TREATMENT REGIMEN

## 2019-02-12 PROCEDURE — 99214 OFFICE O/P EST MOD 30 MIN: CPT | Mod: 25

## 2019-02-12 PROCEDURE — 96372 THER/PROPH/DIAG INJ SC/IM: CPT | Mod: 59

## 2019-02-12 PROCEDURE — ? LIQUID NITROGEN

## 2019-02-12 ASSESSMENT — LOCATION DETAILED DESCRIPTION DERM
LOCATION DETAILED: NASAL DORSUM
LOCATION DETAILED: RIGHT CENTRAL FRONTAL SCALP
LOCATION DETAILED: PERIUMBILICAL SKIN
LOCATION DETAILED: EPIGASTRIC SKIN
LOCATION DETAILED: LEFT SUPERIOR CENTRAL MALAR CHEEK
LOCATION DETAILED: LEFT MEDIAL UPPER BACK
LOCATION DETAILED: LEFT NASAL SIDEWALL
LOCATION DETAILED: LEFT MEDIAL INFERIOR CHEST
LOCATION DETAILED: RIGHT BUTTOCK
LOCATION DETAILED: LEFT MEDIAL MALAR CHEEK
LOCATION DETAILED: RIGHT NASAL ALA
LOCATION DETAILED: RIGHT SUPERIOR FOREHEAD

## 2019-02-12 ASSESSMENT — LOCATION SIMPLE DESCRIPTION DERM
LOCATION SIMPLE: RIGHT FOREHEAD
LOCATION SIMPLE: RIGHT NOSE
LOCATION SIMPLE: CHEST
LOCATION SIMPLE: LEFT CHEEK
LOCATION SIMPLE: RIGHT SCALP
LOCATION SIMPLE: LEFT NOSE
LOCATION SIMPLE: ABDOMEN
LOCATION SIMPLE: LEFT CHEEK
LOCATION SIMPLE: RIGHT BUTTOCK
LOCATION SIMPLE: LEFT UPPER BACK
LOCATION SIMPLE: NOSE

## 2019-02-12 ASSESSMENT — LOCATION ZONE DERM
LOCATION ZONE: FACE
LOCATION ZONE: FACE
LOCATION ZONE: NOSE
LOCATION ZONE: TRUNK
LOCATION ZONE: SCALP
LOCATION ZONE: NOSE

## 2019-02-12 NOTE — PROCEDURE: LIQUID NITROGEN
Detail Level: Zone
Render Post-Care Instructions In Note?: no
Post-Care Instructions: I reviewed with the patient in detail post-care instructions. Patient is to wear sunprotection, and avoid picking at any of the treated lesions. Pt may apply Vaseline to crusted or scabbing areas.
Number Of Freeze-Thaw Cycles: 1 freeze-thaw cycle
Duration Of Freeze Thaw-Cycle (Seconds): 3
Consent: The patient's consent was obtained including but not limited to risks of crusting, scabbing, blistering, scarring, darker or lighter pigmentary change, recurrence, incomplete removal and infection.

## 2019-02-12 NOTE — PROCEDURE: COUNSELING
Detail Level: Detailed
Detail Level: Simple
Quality 224: Stage 0-Iic Melanoma: Overutilization Of Imaging Studies For Only Stage 0-Iic Melanoma: None of the following diagnostic imaging studies ordered: chest X-ray, CT, Ultrasound, MRI, PET, or nuclear medicine scans (ML)
Detail Level: Generalized
Quality 137: Melanoma: Continuity Of Care - Recall System: Patient information entered into a recall system that includes: target date for the next exam specified AND a process to follow up with patients regarding missed or unscheduled appointments

## 2019-05-24 ENCOUNTER — APPOINTMENT (RX ONLY)
Dept: URBAN - METROPOLITAN AREA CLINIC 349 | Facility: CLINIC | Age: 84
Setting detail: DERMATOLOGY
End: 2019-05-24

## 2019-05-24 DIAGNOSIS — D18.0 HEMANGIOMA: ICD-10-CM

## 2019-05-24 DIAGNOSIS — L82.1 OTHER SEBORRHEIC KERATOSIS: ICD-10-CM

## 2019-05-24 DIAGNOSIS — Z85.820 PERSONAL HISTORY OF MALIGNANT MELANOMA OF SKIN: ICD-10-CM

## 2019-05-24 DIAGNOSIS — L82.0 INFLAMED SEBORRHEIC KERATOSIS: ICD-10-CM

## 2019-05-24 DIAGNOSIS — L30.9 DERMATITIS, UNSPECIFIED: ICD-10-CM

## 2019-05-24 DIAGNOSIS — L57.0 ACTINIC KERATOSIS: ICD-10-CM

## 2019-05-24 DIAGNOSIS — Z85.828 PERSONAL HISTORY OF OTHER MALIGNANT NEOPLASM OF SKIN: ICD-10-CM

## 2019-05-24 PROBLEM — D18.01 HEMANGIOMA OF SKIN AND SUBCUTANEOUS TISSUE: Status: ACTIVE | Noted: 2019-05-24

## 2019-05-24 PROBLEM — Z85.46 PERSONAL HISTORY OF MALIGNANT NEOPLASM OF PROSTATE: Status: ACTIVE | Noted: 2019-05-24

## 2019-05-24 PROCEDURE — ? LIQUID NITROGEN

## 2019-05-24 PROCEDURE — ? COUNSELING

## 2019-05-24 PROCEDURE — 17110 DESTRUCTION B9 LES UP TO 14: CPT

## 2019-05-24 PROCEDURE — ? INTRAMUSCULAR KENALOG

## 2019-05-24 PROCEDURE — 17000 DESTRUCT PREMALG LESION: CPT | Mod: 59

## 2019-05-24 PROCEDURE — 96372 THER/PROPH/DIAG INJ SC/IM: CPT | Mod: 59

## 2019-05-24 PROCEDURE — ? TREATMENT REGIMEN

## 2019-05-24 PROCEDURE — 99214 OFFICE O/P EST MOD 30 MIN: CPT | Mod: 25

## 2019-05-24 ASSESSMENT — LOCATION SIMPLE DESCRIPTION DERM
LOCATION SIMPLE: RIGHT SCALP
LOCATION SIMPLE: NOSE
LOCATION SIMPLE: LEFT CHEEK
LOCATION SIMPLE: ABDOMEN
LOCATION SIMPLE: RIGHT BUTTOCK
LOCATION SIMPLE: RIGHT NOSE
LOCATION SIMPLE: LEFT UPPER BACK
LOCATION SIMPLE: RIGHT FOREHEAD
LOCATION SIMPLE: NOSE
LOCATION SIMPLE: LEFT CHEEK
LOCATION SIMPLE: LEFT NOSE
LOCATION SIMPLE: CHEST

## 2019-05-24 ASSESSMENT — LOCATION DETAILED DESCRIPTION DERM
LOCATION DETAILED: RIGHT SUPERIOR FOREHEAD
LOCATION DETAILED: RIGHT BUTTOCK
LOCATION DETAILED: LEFT MEDIAL INFERIOR CHEST
LOCATION DETAILED: RIGHT CENTRAL FRONTAL SCALP
LOCATION DETAILED: NASAL DORSUM
LOCATION DETAILED: PERIUMBILICAL SKIN
LOCATION DETAILED: LEFT SUPERIOR CENTRAL MALAR CHEEK
LOCATION DETAILED: LEFT CENTRAL MALAR CHEEK
LOCATION DETAILED: RIGHT NASAL ALA
LOCATION DETAILED: EPIGASTRIC SKIN
LOCATION DETAILED: LEFT NASAL SIDEWALL
LOCATION DETAILED: LEFT MEDIAL MALAR CHEEK
LOCATION DETAILED: LEFT MEDIAL MALAR CHEEK
LOCATION DETAILED: NASAL TIP
LOCATION DETAILED: NASAL TIP
LOCATION DETAILED: LEFT MEDIAL UPPER BACK

## 2019-05-24 ASSESSMENT — LOCATION ZONE DERM
LOCATION ZONE: NOSE
LOCATION ZONE: FACE
LOCATION ZONE: NOSE
LOCATION ZONE: TRUNK
LOCATION ZONE: SCALP
LOCATION ZONE: FACE

## 2019-05-24 NOTE — PROCEDURE: TREATMENT REGIMEN
Detail Level: Zone
Continue Regimen: TMC twice daily
Plan: Eucerin roughness relief \\nCoupon given\\nEucerin daily moisturizer

## 2019-05-24 NOTE — PROCEDURE: COUNSELING
Detail Level: Detailed
Detail Level: Simple
Detail Level: Generalized
Quality 224: Stage 0-Iic Melanoma: Overutilization Of Imaging Studies For Only Stage 0-Iic Melanoma: None of the following diagnostic imaging studies ordered: chest X-ray, CT, Ultrasound, MRI, PET, or nuclear medicine scans (ML)
Quality 137: Melanoma: Continuity Of Care - Recall System: Patient information entered into a recall system that includes: target date for the next exam specified AND a process to follow up with patients regarding missed or unscheduled appointments

## 2019-05-24 NOTE — PROCEDURE: LIQUID NITROGEN
Duration Of Freeze Thaw-Cycle (Seconds): 3
Render Post-Care Instructions In Note?: no
Consent: The patient's consent was obtained including but not limited to risks of crusting, scabbing, blistering, scarring, darker or lighter pigmentary change, recurrence, incomplete removal and infection.
Include Z78.9 (Other Specified Conditions Influencing Health Status) As An Associated Diagnosis?: Yes
Detail Level: Detailed
Post-Care Instructions: I reviewed with the patient in detail post-care instructions. Patient is to wear sunprotection, and avoid picking at any of the treated lesions. Pt may apply Vaseline to crusted or scabbing areas.
Medical Necessity Clause: This procedure was medically necessary because the lesions that were treated were:
Number Of Freeze-Thaw Cycles: 3 freeze-thaw cycles
Medical Necessity Information: It is in your best interest to select a reason for this procedure from the list below. All of these items fulfill various CMS LCD requirements except the new and changing color options.
Duration Of Freeze Thaw-Cycle (Seconds): 2

## 2019-07-20 PROBLEM — R53.1 WEAKNESS: Status: ACTIVE | Noted: 2019-01-01

## 2019-07-20 PROBLEM — R93.0 ABNORMAL HEAD CT: Status: ACTIVE | Noted: 2019-01-01

## 2019-07-20 NOTE — PROGRESS NOTES
NEUROSURGERY PLAN OF CARE NOTE:     Chart and Imaging Reviewed: Patient Discussed with Dr. Jerman Bob 82 y.o. male presented to [facility] with a PMH of TIA, CAD, and malignant melanoma to the scalp following onset of left facial weakness, left sided weakness and problems with balance over days to weeks. I have independently reviewed and interpreted the CT Head WO contrast which demonstrates increased right frontal edema with a right 3.5 x 2.4 cm area of mixed density concerning for possible metastatic disease versus evolving infarct. Recommend decadron 10 mg IV now followed by 4 mg IV q6H. Consider Keppra 500 mg BID for seizure prophylaxis. Please hold anti-platelet and anti-coagulant medications. Please obtain an MRI Brain WWO contrast when able. No acute neurosurgical intervention necessary at this time. Please call with questions or concerns. Full consult note and evaluation to follow. Annie Mc.  Ambrocio Marshall 137

## 2019-07-20 NOTE — PROGRESS NOTES
Patient arrived to CCU [id number], transferred to bed and placed on monitor. VSS. Permissive HTN. NSR. SpO2 95% on room air. Dual neuro assessment with Dharmesh RN: pupils 4 mm, round, brisk. L facial droop present. Patient talkative, oriented x4, follows commands.  strong and equal. No drift noted to any extremities, however weakness noted to BLE. Patient reports decreased sensation to LLE. Dual skin assessment with Arpita Chappell RN: sacrum intact- allevyn placed. Skin overall dry, flaky with ecchymosis and scattered tears/abrasions (patient reports he has been scratching his legs at home).  Large round scar to top of head (medial and R side) from melanoma removal

## 2019-07-20 NOTE — ED PROVIDER NOTES
51-year-old male presents with progressive weakness and slurred speech  Onset of symptoms several days ago and progressive over the last 24-48 hours  History of TIA/CVA  Currently on Plavix and aspirin    History of prostate cancer and malignant melanoma    The history is provided by the patient and a relative. Fatigue   This is a recurrent problem. The current episode started more than 2 days ago. The problem has been gradually worsening. There was left facial, left upper extremity and left lower extremity focality noted. Primary symptoms include focal weakness, loss of balance, slurred speech and speech difficulty. Pertinent negatives include no agitation, no visual change, no auditory change, no mental status change and no unresponsiveness. There has been no fever. Pertinent negatives include no shortness of breath, no chest pain, no vomiting, no altered mental status, no confusion, no headaches and no nausea. There were no medications administered prior to arrival. Associated medical issues include CVA. Associated medical issues do not include trauma or seizures.         Past Medical History:   Diagnosis Date    Abnormal cardiovascular function study     Arthritis     generalized osteo    ASCVD (arteriosclerotic cardiovascular disease)     Bladder neck obstruction 1/7/2014    Bladder neck obstruction 1/7/2014    CAD (coronary artery disease) 2010    stent x 3    Coronary atherosclerosis of native coronary artery 5/10/2013    CVA (cerebral vascular accident) ([facility]     Dyslipidemia     managed with medication     Dyslipidemia     H/O prostate cancer 9/30/2013    History of prostate cancer 1995    TURP with radiation     HLD (hyperlipidemia) 5/10/2013    HTN (hypertension) 5/10/2013    Hx-TIA (transient ischemic attack) 2009    no residual effects    Hypertension     controlled with med    Hypertensive heart disease 11/5/2015    Malignant neoplasm of prostate ([address] 1/7/2014    Malignant neoplasm of prostate ([facility] 1/7/2014    Melanoma ([facility]     Nausea & vomiting     Other specified forms of chronic ischemic heart disease 1/7/2014    Other specified forms of chronic ischemic heart disease 1/7/2014    Personal history of prostate cancer     Posttraumatic compression fracture of thoracic vertebra ([facility] [address] 10/29/2015    Retention of urine, unspecified 1/7/2014    Retention of urine, unspecified 1/7/2014    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    Stress incontinence, male 1/7/2014    Stress incontinence, male 1/7/2014    TIA (transient ischemic attack) 5/10/2013    Urinary incontinence        Past Surgical History:   Procedure Laterality Date    BIOPSY PROSTATE      CARDIAC SURG PROCEDURE UNLIST  02/26/2010    CARDIAC STENT     HX BLADDER SUSPENSION  12/11    male sling    HX HERNIA REPAIR Right 2005, 1998    inguinal repair    HX KNEE ARTHROSCOPY Right 2012    right    HX KNEE REPLACEMENT Right 05/2013    HX MALIGNANT SKIN LESION EXCISION  11/01/2016    top of head -Melanoma    HX PTCA  2010    3 cardiac stents, mid and distal LAD, mid Cx    HX RADICAL PROSTATECTOMY  1995    HX UROLOGICAL  2010    MALE SLING          Family History:   Problem Relation Age of Onset    Cancer Mother     Heart Disease Father 61        MI or CVA-     Stroke Father 61        aphasic    Heart Attack Father     Cancer Brother         prostate CA    Hypertension Brother     Heart Disease Brother        Social History     Socioeconomic History    Marital status:      Spouse name: Not on file    Number of children: Not on file    Years of education: Not on file    Highest education level: Not on file   Occupational History    Not on file   Social Needs    Financial resource strain: Not on file    Food insecurity:     Worry: Not on file     Inability: Not on file    Transportation needs:     Medical: Not on file     Non-medical: Not on file   Tobacco Use    Smoking status: Former Smoker Packs/day: 1.50     Years: 30.00     Pack years: 45.00     Last attempt to quit: 1985     Years since quittin.1    Smokeless tobacco: Never Used    Tobacco comment: quit     Substance and Sexual Activity    Alcohol use: No    Drug use: No    Sexual activity: Not on file   Lifestyle    Physical activity:     Days per week: Not on file     Minutes per session: Not on file    Stress: Not on file   Relationships    Social connections:     Talks on phone: Not on file     Gets together: Not on file     Attends [employer] service: Not on file     Active member of club or organization: Not on file     Attends meetings of clubs or organizations: Not on file     Relationship status: Not on file    Intimate partner violence:     Fear of current or ex partner: Not on file     Emotionally abused: Not on file     Physically abused: Not on file     Forced sexual activity: Not on file   Other Topics Concern    Not on file   Social History Narrative    Not on file         ALLERGIES: Ace inhibitors and Arb-angiotensin receptor antagonist    Review of Systems   Constitutional: Positive for fatigue. Negative for activity change, chills, diaphoresis and fever. HENT: Negative for dental problem, hearing loss, nosebleeds, rhinorrhea and sore throat. Eyes: Negative for pain, discharge, redness and visual disturbance. Respiratory: Negative for cough, chest tightness and shortness of breath. Cardiovascular: Negative for chest pain, palpitations and leg swelling. Gastrointestinal: Negative for abdominal pain, constipation, diarrhea, nausea and vomiting. Endocrine: Negative for cold intolerance, heat intolerance, polydipsia and polyuria. Genitourinary: Negative for dysuria and flank pain. Musculoskeletal: Positive for arthralgias. Negative for back pain, joint swelling, myalgias and neck pain. Skin: Negative for pallor and rash.    Allergic/Immunologic: Negative for environmental allergies and food allergies. Neurological: Positive for focal weakness, speech difficulty, weakness and loss of balance. Negative for dizziness, tremors, light-headedness, numbness and headaches. Hematological: Negative for adenopathy. Does not bruise/bleed easily. Psychiatric/Behavioral: Negative for agitation, confusion and dysphoric mood. The patient is not nervous/anxious and is not hyperactive. All other systems reviewed and are negative. Vitals:    07/19/19 2244   BP: 136/74   Pulse: 86   Resp: 17   Temp: 97.6 °F (36.4 °C)   SpO2: 92%   Weight: 78 kg (172 lb)   Height: 5' 10\" (1.778 m)            Physical Exam   Constitutional: He is oriented to person, place, and time. He appears well-developed and well-nourished. He appears distressed. HENT:   Head: Normocephalic and atraumatic. Mouth/Throat: Oropharynx is clear and moist. No oropharyngeal exudate. Eyes: Pupils are equal, round, and reactive to light. Conjunctivae and EOM are normal. No scleral icterus. Neck: Normal range of motion. Neck supple. No JVD present. No thyromegaly present. Cardiovascular: Normal rate, regular rhythm, normal heart sounds and intact distal pulses. Exam reveals no gallop and no friction rub. No murmur heard. Pulmonary/Chest: Effort normal and breath sounds normal. No respiratory distress. He has no wheezes. Abdominal: Soft. Bowel sounds are normal. He exhibits no distension. There is no hepatosplenomegaly. There is no tenderness. Musculoskeletal: Normal range of motion. He exhibits no edema, tenderness or deformity. Neurological: He is alert and oriented to person, place, and time. He displays no tremor. A cranial nerve deficit is present. No sensory deficit. He exhibits abnormal muscle tone. Coordination normal. GCS eye subscore is 4. GCS verbal subscore is 5. GCS motor subscore is 6.    Left facial droop noted    Patient does display a slight pronator drift on the left    Left leg is 4/5 strength compared to right leg 5 out of 5 strength    No clonus   Skin: Skin is warm and dry. Capillary refill takes less than 2 seconds. No rash noted. Psychiatric: He has a normal mood and affect. His behavior is normal. Judgment and thought content normal. His speech is slurred. Cognition and memory are normal.   Nursing note and vitals reviewed. MDM  Number of Diagnoses or Management Options  Intracranial mass: new and requires workup  Left-sided weakness: established and worsening  Diagnosis management comments: EKG reviewed  Sinus rhythm with left axis deviation/left anterior fascicular block  Incomplete right bundle-branch pattern noted  No acute ischemic changes    12:30 AM  CT results reviewed with radiology  Page placed to neurosurgery to discuss findings    1:09 AM  Case reviewed with neurosurgery  Recommend MRI and Decadron    Reviewed case with hospitalist service  They will admit       Amount and/or Complexity of Data Reviewed  Clinical lab tests: ordered and reviewed  Tests in the radiology section of CPT®: ordered and reviewed  Tests in the medicine section of CPT®: ordered and reviewed  Decide to obtain previous medical records or to obtain history from someone other than the patient: yes  Obtain history from someone other than the patient: yes  Review and summarize past medical records: yes  Independent visualization of images, tracings, or specimens: yes    Risk of Complications, Morbidity, and/or Mortality  Presenting problems: high  Diagnostic procedures: high  Management options: high  General comments: Elements of this note have been dictated via voice recognition software. Text and phrases may be limited by the accuracy of the software. The chart has been reviewed, but errors may still be present.       Patient Progress  Patient progress: stable         Procedures

## 2019-07-20 NOTE — PROGRESS NOTES
STG: Patient will tolerate thin liquids and regular solids with no overt signs/symptoms of penetration/aspiration with 100% accuracy. STG: Patient will participate in speech/language/cognition evaluation with 100% compliance. LTG: Patient will communicate effectively/efficiently within direct environment with family/friends and caregivers. LTG: Patient will tolerate safest, least restrictive diet without signs/symptoms of oropharyngeal dysphagia. SPEECH LANGUAGE PATHOLOGY: BEDSIDE SWALLOW NOTE: Initial Assessment    NAME/AGE/GENDER: Newton Null is a 82 y.o. male  DATE: 7/20/2019  PRIMARY DIAGNOSIS: Weakness [R53.1]  Abnormal head CT [R93.0]       ICD-10: Treatment Diagnosis: R13.12 oropharyngeal dysphagia  INTERDISCIPLINARY COLLABORATION: Registered Nurse  PRECAUTIONS/ALLERGIES: Ace inhibitors and Arb-angiotensin receptor antagonist   ASSESSMENT:   Based on the objective data described below, Mr. Rosalee Casarez presents with an essentially normal oropharyngeal swallow function characterized by timely swallows with no signs of airway compromise with thin liquids, pureed solids, or regular solids. Pt noted to have mild buccal and labial weakness on left, however, SLP unable to fully evaluate due to pt stating, \"I've never been a smiler,\" and closed mouth smiling for <1 second despite cues and SLP modeling oral motor movements. Pt very talkative with speech intelligibility at 100% with context unknown. Pt tolerating chewable solids without difficulty and no oral residue noted. Recommend regular diet with thin liquids. SLP will follow-up for speech evaluation, to further assess oral motor function, as indicated following review of MRI results. Thank you for this referral.  ?????? ? ? This section established at most recent assessment??????????  PROBLEM LIST (Impairments causing functional limitations):  1.  Oral motor weakness  REHABILITATION POTENTIAL FOR STATED GOALS: Good  PLAN OF CARE:   Patient will benefit from skilled intervention to address the following impairments. RECOMMENDATIONS AND PLANNED INTERVENTIONS (Benefits and precautions of therapy have been discussed with the patient.):  · PO:  Regular  · Thin liquids   MEDICATIONS:  · With liquid  ASPIRATION PRECAUTIONS:  · Slow rate of intake  · Small bites/sips  · Upright at 90 degrees during meal  COMPENSATORY STRATEGIES/MODIFICATIONS INCLUDING:  · Small sips and bites  OTHER RECOMMENDATIONS (including follow up treatment recommendations): · Family training/education  · Patient education  RECOMMENDED DIET MODIFICATIONS DISCUSSED WITH:  · Nursing  · Family  · Patient  FREQUENCY/DURATION: Continue to follow patient 3 times a week for duration of hospital stay to address above goals. RECOMMENDED REHABILITATION/EQUIPMENT: (at time of discharge pending progress): Due to the probability of continued deficits (see above) this patient will not likely need continued skilled speech therapy after discharge. SUBJECTIVE:   Pt calm and cooperative with son present. Pt very talkative, in good spirits, and often talking about his memories as a young man.   History of Present Injury/Illness: Mr. Jesus Spence  has a past medical history of Abnormal cardiovascular function study, Arthritis, ASCVD (arteriosclerotic cardiovascular disease), Bladder neck obstruction (1/7/2014), Bladder neck obstruction (1/7/2014), CAD (coronary artery disease) (2010), Coronary atherosclerosis of native coronary artery (5/10/2013), CVA (cerebral vascular accident) ([facility] [address] Dyslipidemia, Dyslipidemia, H/O prostate cancer (9/30/2013), History of prostate cancer (1995), HLD (hyperlipidemia) (5/10/2013), HTN (hypertension) (5/10/2013), TIA (transient ischemic attack) (2009), Hypertension, Hypertensive heart disease (11/5/2015), Malignant neoplasm of prostate ([address] (1/7/2014), Malignant neoplasm of prostate ([address] (1/7/2014), Melanoma ([address] Nausea & vomiting, Other specified forms of chronic ischemic heart disease (1/7/2014), Other specified forms of chronic ischemic heart disease (1/7/2014), Personal history of prostate cancer, Posttraumatic compression fracture of thoracic vertebra ([facility] (10/29/2015), Retention of urine, unspecified (1/7/2014), Retention of urine, unspecified (1/7/2014), Status post total knee replacement (5/11/2013), Stress incontinence, male (1/7/2014), Stress incontinence, male (1/7/2014), TIA (transient ischemic attack) (5/10/2013), and Urinary incontinence. He also has no past medical history of Difficult intubation, Malignant hyperthermia due to anesthesia, Pseudocholinesterase deficiency, or Unspecified adverse effect of anesthesia. He also  has a past surgical history that includes hx radical prostatectomy (1995); hx ptca (2010); hx bladder suspension (12/11); hx knee arthroscopy (Right, 2012); hx knee replacement (Right, 05/2013); pr cardiac surg procedure unlist (02/26/2010); hx hernia repair (Right, 2005, 1998); hx urological (2010); biopsy prostate; and hx malignant skin lesion excision (11/01/2016). Present Symptoms:    Pain Scale 1: Numeric (0 - 10)  Pain Intensity 1: 0  Current Medications:   No current facility-administered medications on file prior to encounter. Current Outpatient Medications on File Prior to Encounter   Medication Sig Dispense Refill    methotrexate (RHEUMATREX) 2.5 mg tablet 2.5 mg po Monday, Wednesday and Friday. 12 Tab 2    carvedilol (COREG) 6.25 mg tablet Take 1 Tab by mouth two (2) times daily (with meals). 180 Tab 3    cetirizine (ZYRTEC) 10 mg tablet Take 1 Tab by mouth daily. 90 Tab 3    simvastatin (ZOCOR) 20 mg tablet Take 1 Tab by mouth every morning. 90 Tab 3    potassium chloride SR (KLOR-CON 10) 10 mEq tablet Take 2 Tabs by mouth two (2) times a day. 60 Tab 5    hydroCHLOROthiazide (HYDRODIURIL) 25 mg tablet Take 0.5 Tabs by mouth daily. 45 Tab 3    clopidogrel (PLAVIX) 75 mg tab Take 1 Tab by mouth every morning.  Last dose 9/11/15 90 Tab 3  raNITIdine (ZANTAC) 150 mg tablet Take 150 mg by mouth daily.  fluticasone (CUTIVASE) 0.005 % ointment Apply  to affected area two (2) times a day. apply thin layer as directed      triamcinolone acetonide (KENALOG) 0.1 % topical cream Apply  to affected area two (2) times a day. use thin layer      oxyCODONE IR (ROXICODONE) 5 mg immediate release tablet Take 5 mg by mouth every four (4) hours as needed for Pain.  aspirin 81 mg tablet Take 81 mg by mouth every morning. Current Dietary Status:     NPO  History of reflux:  YES    Reflux medication:zantac  Social History/Home Situation:       OBJECTIVE:   Respiratory Status:  Room air     CXR Results:negative  MRI/CT Results:MRI pending, CT=recommend MRI due to ?evolving infarct  Oral Motor Structure/Speech:  Oral-Motor Structure/Motor Speech  Labial: Left droop, Decreased rate, Decreased seal  Dentition: Full  Oral Hygiene: adequate  Lingual: Decreased rate    Cognitive and Communication Status:  Neurologic State: Alert  Orientation Level: Oriented X4  Cognition: Appropriate for age attention/concentration; Follows commands  Perception: Appears intact          BEDSIDE SWALLOW EVALUATION  Oral Assessment:  Oral Assessment  Labial: Left droop; Decreased rate;Decreased seal  Dentition: Full  Oral Hygiene: adequate  Lingual: Decreased rate  P.O. Trials:  Patient Position: upright in bed    The patient was given bite/sip amounts of the following:   Consistency Presented: Solid;Puree; Thin liquid  How Presented: Self-fed/presented;Straw;Spoon; Successive swallows    ORAL PHASE:  Bolus Acceptance: No impairment  Bolus Formation/Control: No impairment  Propulsion: No impairment     Oral Residue: None    PHARYNGEAL PHASE:  Initiation of Swallow: No impairment  Laryngeal Elevation: Functional  Aspiration Signs/Symptoms: None  Vocal Quality: No impairment           Pharyngeal Phase Characteristics: No impairment, issues, or problems     OTHER OBSERVATIONS:  Rate/bite size: WNL   Endurance: WNL   Comments: pt talkative impacting po intake     Tool Used: Dysphagia Outcome and Severity Scale (DEVORA)    Score Comments   Normal Diet  ? 7 With no strategies or extra time needed   Functional Swallow  ? 6 May have mild oral or pharyngeal delay       Mild Dysphagia    ? 5 Which may require one diet consistency restricted (those who demonstrate penetration which is entirely cleared on MBS would be included)   Mild-Moderate Dysphagia  ? 4 With 1-2 diet consistencies restricted       Moderate Dysphagia  ? 3 With 2 or more diet consistencies restricted       Moderately Severe Dysphagia  ? 2 With partial PO strategies (trials with ST only)       Severe Dysphagia  ? 1 With inability to tolerate any PO safely          Score:  Initial: 6 Most Recent: X (Date: --)   Interpretation of Tool: The Dysphagia Outcome and Severity Scale (DEVORA) is a simple, easy-to-use, 7-point scale developed to systematically rate the functional severity of dysphagia based on objective assessment and make recommendations for diet level, independence level, and type of nutrition.        [place] / Plan[place] / Product Type: Medicare /     TREATMENT:    (In addition to Assessment/Re-Assessment sessions the following treatments were rendered)  Assessment/Reassessment only, no treatment provided today  MODALITIES:                                                                    ORAL MOTOR  EXERCISES:                                                                                                                                                                      LARYNGEAL / PHARYNGEAL EXERCISES:                                                                                                                                     __________________________________________________________________________________________________  Safety:   After treatment position/precautions:  · RN notified  · Family at bedside  · Upright in Bed  Progression/Medical Necessity:   · Skilled intervention continues to be required due to medical complications and unable to attend/participate in therapy as expected  · . Compliance with Program/Exercises: Will assess as treatment progresses  Reason for Continuation of Services/Other Comments:  · Patient continues to require skilled intervention due to mild oral motor weakness. · .  Recommendations/Intent for next treatment session: \"Treatment next visit will focus on po trials for diet toleration and speech evaluation as indicated following review of MRI results \".     Total Treatment Duration:  Time In: 6262  Time Out: 800 [address] Jocelyn Kearns MA, CCC-SLP

## 2019-07-20 NOTE — PROGRESS NOTES
Progress Note    Patient: Pamela Powell MRN: [id number]  SSN: [id number]    YOB: 1935  Age: 82 y.o. Sex: male      Admit Date: 7/19/2019    LOS: 0 days     Mr. Fred Bowles is an 82yoM with PMH significant for scalp melanoma with LNs mets on MTX, prior TIA, h/o prostate cancer (with bladder sphincter placement), CAD, HTN, chronic urticaria,  who presents with gait disturbance, fall, slurred speech, and weakness. Patient has been having worsening weakness for over a week. He began having some L facial droop with drooling and slurred speech as well. On ER evaluation, patient has grossly abnormal CT head with evidence of edema with possible evolving infarct. ER MD discussed case with Dr. Giovana Peña of Neurosurgery who graciously reviewed scans and made recommendations for treatment to continue decadron and keppra. Plan for MRI and neuro checks in ICU. Subjective:   He remains stable. He denies blurry vision, headache. The patient has persistent left sided weakness and decreased sensation over the left face and body. Objective:     Vitals:    07/20/19 0648 07/20/19 0649 07/20/19 0659 07/20/19 0755   BP:  187/86 139/74    Pulse: 78 81 78    Resp: 28 25 25    Temp:  97.1 °F (36.2 °C)  97.9 °F (36.6 °C)   SpO2: 97% 96% 96%    Weight:       Height:            Intake and Output:  Current Shift: 07/20 0701 - 07/20 1900  In: -   Out: 100 [Urine:100]  Last three shifts: No intake/output data recorded. Physical Exam:   GENERAL: alert, cooperative, no distress, appears stated age  EYE: negative  LYMPHATIC: Cervical, supraclavicular, and axillary nodes normal.   THROAT & NECK: normal and no erythema or exudates noted. LUNG: clear to auscultation bilaterally  HEART: regular rate and rhythm, S1, S2 normal, no murmur, click, rub or gallop  ABDOMEN: soft, non-tender.  Bowel sounds normal. No masses,  no organomegaly  EXTREMITIES:  extremities normal, atraumatic, no cyanosis or edema  SKIN: Normal.  NEUROLOGIC: mild facial droop; left sided weakness > R; no pronator drift. Decreased sensitivity over left side. Oriented x 3. PSYCHIATRIC: non focal    Lab/Data Review: All lab results for the last 24 hours reviewed. Assessment:     Principal Problem:    Abnormal head CT (7/20/2019)    Active Problems:    HTN (hypertension) (5/10/2013)      Malignant neoplasm of prostate ([address] (11/8/2016)      Weakness (7/20/2019)        Plan:   -Left sided weakness  -Intracranial mass versus CVA + edema  -Hx prostate cancer + artificial bladder sphincter   -Hx scalp melanoma on MTX    Plan:  Continue management under icu  Neurochecks q 4 hrs   Decadron, keppra  Holding plavix, heparin sq  Brain MRI with contrast  Neurosurgery follow up. No plan for surgical intervention  If positive MRI for mets, will contact oncology    HTN  - Allowing permissive HTN, in case CT abnormalities are related to evolving infarct  - Holding home meds for now    -Hypokalemia: replace prn       DVT ppx: GCS    Code status: full for now. NO BILL CHARGE.  SAME DAY ADMISSION     Signed By: Judy Crane MD     July 20, 2019

## 2019-07-20 NOTE — PROGRESS NOTES
TRANSFER - IN REPORT:    Verbal report received from [address] (name) on Alcira Zelaya  being received from ER (unit) for routine progression of care      Report consisted of patients Situation, Background, Assessment and   Recommendations(SBAR). Information from the following report(s) SBAR, Kardex, ED Summary, Intake/Output, MAR and Recent Results was reviewed with the receiving nurse. Opportunity for questions and clarification was provided. Assessment completed upon patients arrival to unit and care assumed.

## 2019-07-20 NOTE — H&P
HOSPITALIST H&P/CONSULT  NAME:  Joanne Treviño   Age:  82 y.o.  :   1935   MRN:   [id number]  PCP: Anju Ga MD  Consulting MD:  Treatment Team: Attending Provider: Francesca Marley MD; Primary Nurse: Brittanie Valerio RN  HPI:   Patient is an 82yoM with PMH significant for scalp melanoma, prior TIA, h/o prostate cancer (with bladder sphincter placement), CAD, HTN who presents with gait disturbance, fall, slurred speech, and weakness. Patient is accompanied by his son who helps provide history as well. Patient has been having worsening weakness for over a week. He began having some L facial droop with drooling and slurred speech as well. He reports that he feels weaker on his L side, including both upper and lower extremities, but L leg weakness is very pronounced. He is alert and interactive. He reports falling recently with some residual pain over his L lower ribs. On ER evaluation, patient has grossly abnormal CT head with evidence of edema with possible evolving infarct. ER MD discussed case with Dr. Amy Figueroa of Neurosurgery who graciously reviewed scans and made recommendations for treatment. Patient will be admitted for further management and evaluation.     Complete ROS done and is as stated in HPI or otherwise negative  Past Medical History:   Diagnosis Date    Abnormal cardiovascular function study     Arthritis     generalized osteo    ASCVD (arteriosclerotic cardiovascular disease)     Bladder neck obstruction 2014    Bladder neck obstruction 2014    CAD (coronary artery disease) 2010    stent x 3    Coronary atherosclerosis of native coronary artery 5/10/2013    CVA [facility]cerebral vascular accident) ([facility] [address]     Dyslipidemia     managed with medication     Dyslipidemia     H/O prostate cancer 2013    History of prostate cancer     TURP with radiation     HLD (hyperlipidemia) 5/10/2013    HTN (hypertension) 5/10/2013    Hx-TIA (transient ischemic attack) 2009    no residual effects    Hypertension     controlled with med    Hypertensive heart disease 11/5/2015    Malignant neoplasm of prostate ([facility] [address] 1/7/2014    Malignant neoplasm of prostate ([facility] [address] 1/7/2014    Melanoma ([facility] [address]     Nausea & vomiting     Other specified forms of chronic ischemic heart disease 1/7/2014    Other specified forms of chronic ischemic heart disease 1/7/2014    Personal history of prostate cancer     Posttraumatic compression fracture of thoracic vertebra ([facility] [address] 10/29/2015    Retention of urine, unspecified 1/7/2014    Retention of urine, unspecified 1/7/2014    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    Stress incontinence, male 1/7/2014    Stress incontinence, male 1/7/2014    TIA (transient ischemic attack) 5/10/2013    Urinary incontinence       Past Surgical History:   Procedure Laterality Date    BIOPSY PROSTATE      CARDIAC SURG PROCEDURE UNLIST  02/26/2010    CARDIAC STENT     HX BLADDER SUSPENSION  12/11    male sling    HX HERNIA REPAIR Right 2005, 1998    inguinal repair    HX KNEE ARTHROSCOPY Right 2012    right    HX KNEE REPLACEMENT Right 05/2013    HX MALIGNANT SKIN LESION EXCISION  11/01/2016    top of head -Melanoma    HX PTCA  2010    3 cardiac stents, mid and distal LAD, mid Cx    HX RADICAL PROSTATECTOMY  1995    HX UROLOGICAL  2010    MALE SLING       Prior to Admission Medications   Prescriptions Last Dose Informant Patient Reported? Taking?   aspirin 81 mg tablet   Yes No   Sig: Take 81 mg by mouth every morning. carvedilol (COREG) 6.25 mg tablet   No No   Sig: Take 1 Tab by mouth two (2) times daily (with meals). cetirizine (ZYRTEC) 10 mg tablet   No No   Sig: Take 1 Tab by mouth daily. clopidogrel (PLAVIX) 75 mg tab   No No   Sig: Take 1 Tab by mouth every morning. Last dose 9/11/15   fluticasone (CUTIVASE) 0.005 % ointment   Yes No   Sig: Apply  to affected area two (2) times a day.  apply thin layer as directed hydroCHLOROthiazide (HYDRODIURIL) 25 mg tablet   No No   Sig: Take 0.5 Tabs by mouth daily. methotrexate (RHEUMATREX) 2.5 mg tablet   No No   Si.5 mg po Monday, Wednesday and Friday. oxyCODONE IR (ROXICODONE) 5 mg immediate release tablet   Yes No   Sig: Take 5 mg by mouth every four (4) hours as needed for Pain.   potassium chloride SR (KLOR-CON 10) 10 mEq tablet   No No   Sig: Take 2 Tabs by mouth two (2) times a day. raNITIdine (ZANTAC) 150 mg tablet   Yes No   Sig: Take 150 mg by mouth daily. simvastatin (ZOCOR) 20 mg tablet   No No   Sig: Take 1 Tab by mouth every morning. triamcinolone acetonide (KENALOG) 0.1 % topical cream   Yes No   Sig: Apply  to affected area two (2) times a day. use thin layer      Facility-Administered Medications: None     Allergies   Allergen Reactions    Ace Inhibitors Angioedema    Arb-Angiotensin Receptor Antagonist Angioedema      Social History     Tobacco Use    Smoking status: Former Smoker     Packs/day: 1.50     Years: 30.00     Pack years: 45.00     Last attempt to quit: 1985     Years since quittin.1    Smokeless tobacco: Never Used    Tobacco comment: quit     Substance Use Topics    Alcohol use: No      Family History   Problem Relation Age of Onset    Cancer Mother     Heart Disease Father 61        MI or CVA-     Stroke Father 61        aphasic    Heart Attack Father     Cancer Brother         prostate CA    Hypertension Brother     Heart Disease Brother       Objective:     Visit Vitals  /72   Pulse 77   Temp 97.6 °F (36.4 °C)   Resp 21   Ht 5' 10\" (1.778 m)   Wt 78 kg (172 lb)   SpO2 92%   BMI 24.68 kg/m²      Temp (24hrs), Av.6 °F (36.4 °C), Min:97.6 °F (36.4 °C), Max:97.6 °F (36.4 °C)    Oxygen Therapy  O2 Sat (%): 92 % (19)  Pulse via Oximetry: 78 beats per minute (19)  O2 Device: Room air (19)  Physical Exam:  General:    Alert, cooperative, no distress, appears stated age. Head:   Scalp with depressed region of R superior/lateral aspect  Nose:  Nares normal. No drainage or sinus tenderness. Lungs:   Clear to auscultation bilaterally. No Wheezing or Rhonchi. No rales. Heart:   Regular rate and rhythm,  no murmur, rub or gallop. Abdomen:   Soft, non-tender. Not distended. Bowel sounds normal.   Extremities: No cyanosis. No edema. No clubbing  Skin:     Texture, turgor normal. No rashes or lesions. Not Jaundiced. Some erythema/bruising over L lower ribs  Neurologic: Alert and oriented x 3, PERRL, EOMI, mild slurred speech, mild L facial droop,  strength equal, but strength in LUE is 4/5 compared to right. RLE shows normal strength, LLE has 2/5 strength. Data Review:   Recent Results (from the past 24 hour(s))   CBC WITH AUTOMATED DIFF    Collection Time: 07/19/19 10:59 PM   Result Value Ref Range    WBC 9.5 4.3 - 11.1 K/uL    RBC 4.77 4.23 - 5.6 M/uL    HGB 15.0 13.6 - 17.2 g/dL    HCT 43.8 41.1 - 50.3 %    MCV 91.8 79.6 - 97.8 FL    MCH 31.4 26.1 - 32.9 PG    MCHC 34.2 31.4 - 35.0 g/dL    RDW 13.4 11.9 - 14.6 %    PLATELET 336 613 - 137 K/uL    MPV 10.2 9.4 - 12.3 FL    ABSOLUTE NRBC 0.00 0.0 - 0.2 K/uL    DF AUTOMATED      NEUTROPHILS 84 (H) 43 - 78 %    LYMPHOCYTES 8 (L) 13 - 44 %    MONOCYTES 7 4.0 - 12.0 %    EOSINOPHILS 1 0.5 - 7.8 %    BASOPHILS 0 0.0 - 2.0 %    IMMATURE GRANULOCYTES 1 0.0 - 5.0 %    ABS. NEUTROPHILS 8.0 1.7 - 8.2 K/UL    ABS. LYMPHOCYTES 0.7 0.5 - 4.6 K/UL    ABS. MONOCYTES 0.6 0.1 - 1.3 K/UL    ABS. EOSINOPHILS 0.1 0.0 - 0.8 K/UL    ABS. BASOPHILS 0.0 0.0 - 0.2 K/UL    ABS. IMM.  GRANS. 0.1 0.0 - 0.5 K/UL   METABOLIC PANEL, COMPREHENSIVE    Collection Time: 07/19/19 10:59 PM   Result Value Ref Range    Sodium 141 136 - 145 mmol/L    Potassium 3.7 3.5 - 5.1 mmol/L    Chloride 103 98 - 107 mmol/L    CO2 27 21 - 32 mmol/L    Anion gap 11 7 - 16 mmol/L    Glucose 156 (H) 65 - 100 mg/dL    BUN 11 8 - 23 MG/DL    Creatinine 1.44 0.8 - 1.5 MG/DL    GFR est AA >60 >60 ml/min/1.73m2    GFR est non-AA 50 (L) >60 ml/min/1.73m2    Calcium 9.4 8.3 - 10.4 MG/DL    Bilirubin, total 1.0 0.2 - 1.1 MG/DL    ALT (SGPT) 33 12 - 65 U/L    AST (SGOT) 36 15 - 37 U/L    Alk. phosphatase 94 50 - 136 U/L    Protein, total 7.1 6.3 - 8.2 g/dL    Albumin 4.0 3.2 - 4.6 g/dL    Globulin 3.1 2.3 - 3.5 g/dL    A-G Ratio 1.3 1.2 - 3.5     MAGNESIUM    Collection Time: 07/19/19 10:59 PM   Result Value Ref Range    Magnesium 2.0 1.8 - 2.4 mg/dL   PHOSPHORUS    Collection Time: 07/19/19 10:59 PM   Result Value Ref Range    Phosphorus 2.3 2.3 - 3.7 MG/DL   TSH 3RD GENERATION    Collection Time: 07/19/19 10:59 PM   Result Value Ref Range    TSH 3.840 (H) 0.358 - 3.740 uIU/mL     Imaging /Procedures /Studies     Assessment and Plan: Active Hospital Problems    Diagnosis Date Noted    Weakness 07/20/2019    Abnormal head CT 07/20/2019    Malignant neoplasm of prostate ([facility] [address] 11/08/2016    HTN (hypertension) 05/10/2013       PLAN  Abnormal head CT  - Admission for further work-up  - Appreciate Dr. Sherman Shetty' recommendations:  Pt has already received decadron 10mg IV in ER, will continue decadron 4mg q6h. Will start keppra 500mg BID for seizure prophylaxis. Holding plavix and will not start inpatient anticoagulation   - Q4H neurochecks  - Ordered MRI brain w/wo contrast, pending these findings will determine additional work-up.   Will not order echo/carotids at this time, but may warrant this if findings are more consistent with CVA rather than malignant neoplastic process  - Of note, patient reports needing to be \"knocked out\" for MRI, advised that we should be able to give him something to help him relax prior to MRI, but we cannot truly put him \"to sleep\" without intubation to protect his airway, will have ativan prn ordered    Known h/o scalp melanoma and prior prostate cancer  - Concern for IC mass effect from metastatic process  - Secondary to prior prostate cancer, pt has an artificial bladder sphincter and may need Urology aided catheter placement    HTN  - Allowing permissive HTN, in case CT abnormalities are related to evolving infarct  - Holding home meds for now        Anticipated discharge: 2-3 days, pending work-up and clinical course    Signed By: Cassi Schwab MD     July 20, 2019

## 2019-07-20 NOTE — PROGRESS NOTES
Bedside and Verbal shift change report given to Pedro Luis Ag Randy and Jae Severino RN (oncoming nurse) by Marco Agee (offgoing nurse). Report included the following information SBAR, Kardex, ED Summary, Procedure Summary, Intake/Output, MAR, Recent Results, Cardiac Rhythm NSR and Alarm Parameters .     Dual neuro assessment completed

## 2019-07-20 NOTE — CONSULTS
NEUROSURGERY CONSULT NOTE:     CC: Left facial droop, left-sided weakness and problems with balance    HPI:   Floydul Big 82 y.o. male with a PMH of multiple medical comorbidities including coronary artery disease, CVA, hyperlipidemia, hypertension and malignant melanoma requiring significant right scalp resection who presents to [address] [place] [address] with worsening left facial droop, left-sided weakness problems with balance is been progressing over the course the past few weeks. Patient's first noticed a left facial droop when he and his family felt as if he was drooling on the corner of his left mouth. He also states that he had problems with balance of the course the past few weeks it has been progressively getting worse. He denies any loss of consciousness or seizure-like activity. He denies any current headaches. He also states he feels his left upper extremity is clumsy and does not perform like it has previously. He therefore presented to the ED on the evening of 7/19/2019 and underwent a CT head that demonstrated  increased right frontal edema with a right 3.5 x 2.4 cm area of mixed density concerning for possible metastatic disease versus evolving infarct. He was reportedly on aspirin and Plavix prior prior to presentation to the ED.     Past Medical History:   Diagnosis Date    Abnormal cardiovascular function study     Arthritis     generalized osteo    ASCVD (arteriosclerotic cardiovascular disease)     Bladder neck obstruction 1/7/2014    Bladder neck obstruction 1/7/2014    CAD (coronary artery disease) 2010    stent x 3    Coronary atherosclerosis of native coronary artery 5/10/2013    [facility]A [facility]cere[facility] accident) ([facility] [address]     Dyslipidemia     managed with medication     Dyslipidemia     H/O prostate cancer 9/30/2013    History of prostate cancer 1995    TURP with radiation     HLD (hyperlipidemia) 5/10/2013    HTN (hypertension) 5/10/2013    Hx-TIA (transient ischemic attack) 2009    no residual effects    Hypertension     controlled with med    Hypertensive heart disease 11/5/2015    Malignant neoplasm of prostate ([facility] [address] 1/7/2014    Malignant neoplasm of prostate ([facility] [address] 1/7/2014    Melanoma ([facility] [address]     Nausea & vomiting     Other specified forms of chronic ischemic heart disease 1/7/2014    Other specified forms of chronic ischemic heart disease 1/7/2014    Personal history of prostate cancer     Posttraumatic compression fracture of thoracic vertebra ([facility] [address] 10/29/2015    Retention of urine, unspecified 1/7/2014    Retention of urine, unspecified 1/7/2014    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    Stress incontinence, male 1/7/2014    Stress incontinence, male 1/7/2014    TIA (transient ischemic attack) 5/10/2013    Urinary incontinence      Past Surgical History:   Procedure Laterality Date    BIOPSY PROSTATE      CARDIAC SURG PROCEDURE UNLIST  02/26/2010    CARDIAC STENT     HX BLADDER SUSPENSION  12/11    male sling    HX HERNIA REPAIR Right 2005, 1998    inguinal repair    HX KNEE ARTHROSCOPY Right 2012    right    HX KNEE REPLACEMENT Right 05/2013    HX MALIGNANT SKIN LESION EXCISION  11/01/2016    top of head -Melanoma    HX PTCA  2010    3 cardiac stents, mid and distal LAD, mid Cx    HX RADICAL PROSTATECTOMY  1995    HX UROLOGICAL  2010    MALE SLING      Allergies   Allergen Reactions    Ace Inhibitors Angioedema    Arb-Angiotensin Receptor Antagonist Angioedema     Social History     Socioeconomic History    Marital status:      Spouse name: Not on file    Number of children: Not on file    Years of education: Not on file    Highest education level: Not on file   Occupational History    Not on file   Social Needs    Financial resource strain: Not on file    Food insecurity:     Worry: Not on file     Inability: Not on file    Transportation needs:     Medical: Not on file     Non-medical: Not on file Tobacco Use    Smoking status: Former Smoker     Packs/day: 1.50     Years: 30.00     Pack years: 45.00     Last attempt to quit: 1985     Years since quittin.1    Smokeless tobacco: Never Used    Tobacco comment: quit     Substance and Sexual Activity    Alcohol use: No    Drug use: No    Sexual activity: Not on file   Lifestyle    Physical activity:     Days per week: Not on file     Minutes per session: Not on file    Stress: Not on file   Relationships    Social connections:     Talks on phone: Not on file     Gets together: Not on file     Attends Christianity service: Not on file     Active member of club or organization: Not on file     Attends meetings of clubs or organizations: Not on file     Relationship status: Not on file    Intimate partner violence:     Fear of current or ex partner: Not on file     Emotionally abused: Not on file     Physically abused: Not on file     Forced sexual activity: Not on file   Other Topics Concern    Not on file   Social History Narrative    Not on file     Review of Systems - complete review of systems was performed and was positive only for left facial weakness left-sided weakness problems balance    Physical Exam:   Visit Vitals  /76   Pulse 79   Temp 97.9 °F (36.6 °C)   Resp 22   Ht 5' 10\" (1.778 m)   Wt 172 lb (78 kg)   SpO2 95%   BMI 24.68 kg/m²   General: No acute distress  Awake, alert, and oriented to person, place, time, and situation   Eyes open spontaneously   PERRL, EOMI  Hearing grossly intact   Face symmetric and tongue mid-line on protrusion   Sternocleidomastoid and trapezius strength 5/5  Left drift present on examination  Patient with strength exam as follows:   Upper Extremities: Right Left      Deltoid  5 5    Biceps  5 5    Triceps 5 5      5 5     Lower Extremities:      Hip Flex 5 5    Quads  5 5    Hamstrings 5 5    Dorsiflex 5 5    Plantarflex 5 5    EHL  5 5  Sensation grossly intact touch  DTR 2+  No clonus or babinski present   Gait Deferred secondary to fall risk, will call this weekend  Large right frontal well-healed area of scalp resection where the skin is extremely thin overlying the calvarium there is also a area at the anterior border of the scalp resection that appears to be consistent with recurrent melanoma of the scalp with irregular pigmented borders measuring approximately 2-1/2 x 4 cm    Assessment/Plan:  Newton Null 82 y.o. male presented to [facility] with a PMH of TIA, CAD, and malignant melanoma to the scalp following onset of left facial weakness, left sided weakness and problems with balance over days to weeks. I have independently reviewed and interpreted the CT Head WO contrast which demonstrates increased right frontal edema with a right 3.5 x 2.4 cm area of mixed density concerning for possible metastatic disease versus evolving infarct. Recommend decadron 10 mg IV now followed by 4 mg IV q6H. Consider Keppra 500 mg BID for seizure prophylaxis. Please hold anti-platelet and anti-coagulant medications. Please obtain an MRI Brain WWO contrast when able. No acute neurosurgical intervention necessary at this time. At this time recommend obtaining a CT chest abdomen pelvis with contrast as well for staging purposes is likely metastatic melanoma. Radha Angel. Fredy Juarez, [address] [place] [address]     Notes are transcribed with Loy Schaumann, a medical voice recording dictation service, and may contain minor errors.

## 2019-07-20 NOTE — ED TRIAGE NOTES
Patient brought in from home via EMS from home. Bilateral leg weakness and fall this evening. Weakness for several weeks.  Equal  strength, no facial droop or speech deficits

## 2019-07-20 NOTE — ED NOTES
Pt has a urinary artificial sphincter. Pt has voided a total of 40 mL urine while being in the ER for 7 hours. Bladder scan shows only 73 mL residual urine.

## 2019-07-21 PROBLEM — C79.31 MALIGNANT MELANOMA METASTATIC TO BRAIN (HCC): Status: ACTIVE | Noted: 2019-01-01

## 2019-07-21 PROBLEM — G93.6 VASOGENIC BRAIN EDEMA (HCC): Status: ACTIVE | Noted: 2019-01-01

## 2019-07-21 NOTE — CONSULTS
LEAPFROG PROTOCOL NOTE    Acquanettrobnison Farris  7/20/2019    The patient is currently in the critical care setting managed by Dr. Sarah Christensen with brain masses. The patient's chart is reviewed and the patient is discussed with the staff. Patient is currently hemodynamically stable. Patient has no needs identified for Intensivist management in the critical care setting at this time. Please notify us if can be of assistance. No charge billed to the patient. Thank you.     Santana Maloney MD

## 2019-07-21 NOTE — CONSULTS
Inpatient Hematology / Oncology Consult Note    Reason for Consult:  Weakness [R53.1]; Abnormal head CT [R93.0]  Referring Physician:  Treva Gaytan MD    History of Present Illness:  Mr. Luca Chávez is a 82 y.o. male admitted on 7/19/2019 with a primary diagnosis of The primary encounter diagnosis was Intracranial mass. A diagnosis of Left-sided weakness was also pertinent to this visit. Marcelo Jalloh 82 male history of hypertension, CAD, TIA, CAD status post stents, prostate cancer status post prostatectomy and XRT 2005 (felt to be in remission since), treated in early 2018 for malignant melanoma, now admitted with slurred speech and gait disturbances. Brain MRI unfortunately is concerning for basal ganglia as well as subfrontal metastasis with associated vasogenic edema. He has since been started on dexamethasone as well as Keppra. Family is at bedside. His oncologic history with respect to malignant melanoma is as follows: Patient was diagnosed with malignant melanoma over his scalp region in early 2018. He is status post surgical resection as well as neck dissection for a positive sentinel lymph node. Post resection, patient unfortunately developed recurrent skin disease around site of his prior resection felt not to be resection margin related. He was given pembrolizumab x5-6 doses (till 8/17) under care of Dr. Geovani Ayon at [facility]. Thereafter this was stopped after patient developed cutaneous toxicity as well as lip swelling. He has since been monitored and disease is felt to be in remission. Review of Systems:  As mentioned above. All other systems reviewed in full and are unremarkable.        Allergies   Allergen Reactions    Ace Inhibitors Angioedema    Arb-Angiotensin Receptor Antagonist Angioedema     Past Medical History:   Diagnosis Date    Abnormal cardiovascular function study     Arthritis     generalized osteo    ASCVD (arteriosclerotic cardiovascular disease)     Bladder neck obstruction 1/7/2014    Bladder neck obstruction 1/7/2014    CAD (coronary artery disease) 2010    stent x 3    Coronary atherosclerosis of native coronary artery 5/10/2013    CVA (cerebral vascular accident) [facility] [address]     Dyslipidemia     managed with medication     Dyslipidemia     H/O prostate cancer 9/30/2013    History of prostate cancer 1995    TURP with radiation     HLD (hyperlipidemia) 5/10/2013    HTN (hypertension) 5/10/2013    Hx-TIA (transient ischemic attack) 2009    no residual effects    Hypertension     controlled with med    Hypertensive heart disease 11/5/2015    Malignant neoplasm of prostate ([address] 1/7/2014    Malignant neoplasm of prostate ([address] 1/7/2014    Melanoma ([address]     Nausea & vomiting     Other specified forms of chronic ischemic heart disease 1/7/2014    Other specified forms of chronic ischemic heart disease 1/7/2014    Personal history of prostate cancer     Posttraumatic compression fracture of thoracic vertebra ([address] 10/29/2015    Retention of urine, unspecified 1/7/2014    Retention of urine, unspecified 1/7/2014    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    Stress incontinence, male 1/7/2014    Stress incontinence, male 1/7/2014    TIA (transient ischemic attack) 5/10/2013    Urinary incontinence      Past Surgical History:   Procedure Laterality Date    BIOPSY PROSTATE      CARDIAC SURG PROCEDURE UNLIST  02/26/2010    CARDIAC STENT     HX BLADDER SUSPENSION  12/11    male sling    HX HERNIA REPAIR Right 2005, 1998    inguinal repair    HX KNEE ARTHROSCOPY Right 2012    right    HX KNEE REPLACEMENT Right 05/2013    HX MALIGNANT SKIN LESION EXCISION  11/01/2016    top of head -Melanoma    HX PTCA  2010    3 cardiac stents, mid and distal LAD, mid Cx    HX RADICAL PROSTATECTOMY  1995    HX UROLOGICAL  2010    MALE SLING      Family History   Problem Relation Age of Onset    Cancer Mother     Heart Disease Father 61        MI or CVA-     Stroke Father 61        aphasic    Heart Attack Father     Cancer Brother         prostate CA    Hypertension Brother     Heart Disease Brother      Social History     Socioeconomic History    Marital status:      Spouse name: Not on file    Number of children: Not on file    Years of education: Not on file    Highest education level: Not on file   Occupational History    Not on file   Social Needs    Financial resource strain: Not on file    Food insecurity:     Worry: Not on file     Inability: Not on file    Transportation needs:     Medical: Not on file     Non-medical: Not on file   Tobacco Use    Smoking status: Former Smoker     Packs/day: 1.50     Years: 30.00     Pack years: 45.00     Last attempt to quit: 1985     Years since quittin.2    Smokeless tobacco: Never Used    Tobacco comment: quit     Substance and Sexual Activity    Alcohol use: No    Drug use: No    Sexual activity: Not on file   Lifestyle    Physical activity:     Days per week: Not on file     Minutes per session: Not on file    Stress: Not on file   Relationships    Social connections:     Talks on phone: Not on file     Gets together: Not on file     Attends Taoist service: Not on file     Active member of club or organization: Not on file     Attends meetings of clubs or organizations: Not on file     Relationship status: Not on file    Intimate partner violence:     Fear of current or ex partner: Not on file     Emotionally abused: Not on file     Physically abused: Not on file     Forced sexual activity: Not on file   Other Topics Concern    Not on file   Social History Narrative    Not on file     Current Facility-Administered Medications   Medication Dose Route Frequency Provider Last Rate Last Dose    metoprolol (LOPRESSOR) injection 2.5 mg  2.5 mg IntraVENous Q4H PRN Raven Reyna MD   2.5 mg at 19 0544    famotidine (PF) (PEPCID) 20 mg in sodium chloride 0.9% 10 mL injection  20 mg IntraVENous Q12H Catherine Palomino MD   20 mg at 19 0219    oxyCODONE IR (ROXICODONE) tablet 5 mg  5 mg Oral Q4H PRN Jennifer Tillman MD        simvastatin (ZOCOR) tablet 20 mg  20 mg Oral 7am Jennifer Tillman MD   Stopped at 19 0700    sodium chloride (NS) flush 5-40 mL  5-40 mL IntraVENous Q8H Jennifer Tillman MD   10 mL at 19 1438    sodium chloride (NS) flush 5-40 mL  5-40 mL IntraVENous PRN Jennifer Tillman MD        acetaminophen (TYLENOL) tablet 650 mg  650 mg Oral Q4H PRN Jennifer Tillman MD        ondansetron [facility]) injection 4 mg  4 mg IntraVENous Q4H PRN Jennifer Tillman MD        dexamethasone (DECADRON) 4 mg/mL injection 4 mg  4 mg IntraVENous Q6H Jennifer Tillman MD   4 mg at 19 1704    0.9% sodium chloride infusion  75 mL/hr IntraVENous CONTINUOUS Catherine Palomino MD 75 mL/hr at 19 1135 75 mL/hr at 19 1135    LORazepam (ATIVAN) injection 1 mg  1 mg IntraVENous Q2H PRN Catherine Palomino MD   1 mg at 19 1800    levETIRAcetam (KEPPRA) 500 mg in 0.9% sodium chloride 100 mL IVPB  500 mg IntraVENous Q12H Catherine Palomino  mL/hr at 19 0932 500 mg at 19 0932    haloperidol lactate (HALDOL) injection 5 mg  5 mg IntraVENous Q4H PRN Jennifer Tillman MD   5 mg at 19 0244       OBJECTIVE:  Patient Vitals for the past 8 hrs:   BP Temp Pulse Resp SpO2   19 1630 162/76 97.4 °F (36.3 °C) 80 16 90 %   19 1500 151/70  75 14 95 %   19 1430 138/85  86 26 95 %   19 1402 134/78  87 26 95 %   19 1358 135/84  87 (!) 61 96 %   19 1330 142/81  80 16 97 %   19 1300 173/81  82 15 93 %   19 1230 157/75 97.4 °F (36.3 °C) 76 (!) 31 96 %   19 1200 155/79  84 14 96 %     Temp (24hrs), Av.9 °F (36.1 °C), Min:94 °F (34.4 °C), Max:97.6 °F (36.4 °C)    No intake/output data recorded.     Physical Exam:  Constitutional: WEak,  male in no acute distress, sitting comfortably in the hospital bed. HEENT: Normocephalic and atraumatic. Oropharynx is clear, mucous membranes are moist.  Pupils are equal, round, and reactive to light. Extraocular muscles are intact. Sclerae anicteric. Neck supple without JVD. No thyromegaly present. Lymph node   No palpable submandibular, cervical, supraclavicular, axillary or inguinal lymph nodes. Skin Warm and dry. No bruising and no rash noted. No erythema. No pallor. Respiratory Lungs are clear to auscultation bilaterally without wheezes, rales or rhonchi, normal air exchange without accessory muscle use. CVS Normal rate, regular rhythm and normal S1 and S2. No murmurs, gallops, or rubs. Abdomen Soft, nontender and nondistended, normoactive bowel sounds. No palpable mass. No hepatosplenomegaly. Neuro Grossly nonfocal with no obvious sensory or motor deficits. MSK Normal range of motion in general.  No edema and no tenderness. Psych Appropriate mood and affect.         Labs:    Recent Results (from the past 24 hour(s))   METABOLIC PANEL, BASIC    Collection Time: 07/21/19  3:28 AM   Result Value Ref Range    Sodium 144 136 - 145 mmol/L    Potassium 3.5 3.5 - 5.1 mmol/L    Chloride 107 98 - 107 mmol/L    CO2 26 21 - 32 mmol/L    Anion gap 11 7 - 16 mmol/L    Glucose 148 (H) 65 - 100 mg/dL    BUN 8 8 - 23 MG/DL    Creatinine 0.75 (L) 0.8 - 1.5 MG/DL    GFR est AA >60 >60 ml/min/1.73m2    GFR est non-AA >60 >60 ml/min/1.73m2    Calcium 8.7 8.3 - 10.4 MG/DL   CBC WITH AUTOMATED DIFF    Collection Time: 07/21/19  3:28 AM   Result Value Ref Range    WBC 12.6 (H) 4.3 - 11.1 K/uL    RBC 4.43 4.23 - 5.6 M/uL    HGB 13.9 13.6 - 17.2 g/dL    HCT 41.8 41.1 - 50.3 %    MCV 94.4 79.6 - 97.8 FL    MCH 31.4 26.1 - 32.9 PG    MCHC 33.3 31.4 - 35.0 g/dL    RDW 13.2 11.9 - 14.6 %    PLATELET 658 013 - 857 K/uL    MPV 10.4 9.4 - 12.3 FL    ABSOLUTE NRBC 0.00 0.0 - 0.2 K/uL    DF AUTOMATED      NEUTROPHILS 91 (H) 43 - 78 %    LYMPHOCYTES 6 (L) 13 - 44 % MONOCYTES 3 (L) 4.0 - 12.0 %    EOSINOPHILS 0 (L) 0.5 - 7.8 %    BASOPHILS 0 0.0 - 2.0 %    IMMATURE GRANULOCYTES 1 0.0 - 5.0 %    ABS. NEUTROPHILS 11.4 (H) 1.7 - 8.2 K/UL    ABS. LYMPHOCYTES 0.7 0.5 - 4.6 K/UL    ABS. MONOCYTES 0.4 0.1 - 1.3 K/UL    ABS. EOSINOPHILS 0.0 0.0 - 0.8 K/UL    ABS. BASOPHILS 0.0 0.0 - 0.2 K/UL    ABS. IMM. GRANS. 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 [facility]) ICD-10-CM: C79.31  ICD-9-CM: 198.3  7/21/2019        Vasogenic brain edema ([facility] ICD-10-CM: G93.6  ICD-9-CM: 348.5  7/21/2019        Weakness ICD-10-CM: R53.1  ICD-9-CM: 780.79  7/20/2019        * (Principal) Abnormal head CT ICD-10-CM: R93.0  ICD-9-CM: 793.0  7/20/2019        Chronic urticaria ICD-10-CM: L50.8  ICD-9-CM: 708.8  12/7/2017        Malignant melanoma of scalp ([facility] [address] ICD-10-CM: C43.4  ICD-9-CM: 172.4  11/9/2016        Malignant neoplasm of prostate ([facility] ICD-10-CM: C61  ICD-9-CM: 185  11/8/2016        Hx-TIA (transient ischemic attack) ICD-10-CM: Z86.73  ICD-9-CM: V12.54  5/17/2016    Overview Addendum 6/13/2016  7:37 AM by Jyoti Quinones MD     TIA occurred with slurred speech and arm numbness in February 2010 prior to his abnormal stress test and PCI, evaluated by Keny Addison and Virgen.    May 2016 - < 50% stenosis bilaterally             Male stress incontinence ICD-10-CM: N39.3  ICD-9-CM: 788.32  11/9/2015        Edema ICD-10-CM: R60.9  ICD-9-CM: 782.3  11/5/2015        Hypertensive heart disease ICD-10-CM: I11.9  ICD-9-CM: 402.90  11/5/2015        S/P kyphoplasty ICD-10-CM: J82.807  ICD-9-CM: V45.89  10/29/2015        Closed fracture of dorsal (thoracic) vertebra without mention of spinal cord injury ICD-10-CM: S22.009A  ICD-9-CM: 805.2  9/16/2015        Dyslipidemia ICD-10-CM: E78.5  ICD-9-CM: 272.4  Unknown        HTN (hypertension) ICD-10-CM: I10  ICD-9-CM: 401.9  5/10/2013        Coronary atherosclerosis of native coronary artery ICD-10-CM: I25.10  ICD-9-CM: 414.01  5/10/2013    Overview Signed 5/17/2016  8:01 AM by Urszula Ngo MD     Drug eluting stents to the mid and distal LAD and mid Cx, normal ventricle, 2/7/10             HLD (hyperlipidemia) ICD-10-CM: E78.5  ICD-9-CM: 272.4  5/10/2013            82 male history of hypertension, CAD, TIA, CAD status post stents, prostate cancer status post prostatectomy and XRT 2005 (felt to be in remission since), treated in early 2018 for malignant melanoma, now admitted with slurred speech and gait disturbances. Brain MRI unfortunately is concerning for basal ganglia as well as subfrontal metastasis with associated vasogenic edema. He has since been started on dexamethasone as well as Keppra. Family is at bedside. His oncologic history with respect to malignant melanoma is as follows: Patient was diagnosed with malignant melanoma over his scalp region in early 2018. He is status post surgical resection as well as neck dissection for a positive sentinel lymph node. Post resection, patient unfortunately developed recurrent skin disease around site of his prior resection felt not to be resection margin related. He was given pembrolizumab x5-6 doses (till 8/17) under care of Dr. Nacho Turner at [facility]. Thereafter this was stopped after patient developed cutaneous toxicity as well as lip swelling. He has since been monitored and disease is felt to be in remission. Clinical picture is consistent with metastatic process, likely related to his history of malignant melanoma. Diagnosis and further work-up discussed with multiple children at his bedside.   Various options discussed including management going forward including radiation oncology evaluation, as well as CT CAP to assess any systemic relapse elsewhere. PSA can also be checked in such a case (though doubt of prostate origin). Palliative care/hospice also discussed which they are leaning towards. Will get palliative care services to see patient and family to discuss services provided. In interim continue dexamethasone. Lab studies and imaging studies (CT/ brain MRI) were personally reviewed. Pertinent old records were reviewed. Thank you for allowing us to participate in the care of Mr. Samaria Jaramillo.            Noe Guzman MD  [address]  Office : [phone]  Fax : [phone]

## 2019-07-21 NOTE — PROGRESS NOTES
Pt dozing. Arouses to name. No pain medication needed at this time. Family remains at bedside. Oncology at bedside to talk with family.

## 2019-07-21 NOTE — PROGRESS NOTES
Problem: Mobility Impaired (Adult and Pediatric)  Goal: *Acute Goals and Plan of Care (Insert Text)  Description    LTG:  (1.)Mr. Francia Lam will move from supine to sit and sit to supine , scoot up and down and roll side to side in bed with CONTACT GUARD ASSIST within 7 treatment day(s). (2.)Mr. Francia Lam will transfer from bed to chair and chair to bed with CONTACT GUARD ASSIST using the least restrictive device within 7 treatment day(s). (3.)Mr. Francia Lam will ambulate with MINIMAL ASSIST for 20+ feet with the least restrictive device within 7 treatment day(s). ________________________________________________________________________________________________     Outcome: Progressing Towards Goal     PHYSICAL THERAPY: Initial Assessment and PM 7/21/2019  INPATIENT:    [place] / [place] / Product Type: Medicare /       NAME/AGE/GENDER: Lew Wagoner is a 82 y.o. male   PRIMARY DIAGNOSIS: Weakness [R53.1]  Abnormal head CT [R93.0] Abnormal head CT   Abnormal head CT          ICD-10: Treatment Diagnosis:    Generalized Muscle Weakness (M62.81)  Difficulty in walking, Not elsewhere classified (R26.2)   Precaution/Allergies:  Ace inhibitors and Arb-angiotensin receptor antagonist      ASSESSMENT:     Mr. Francia Lam presents supine at arrival with 2-3 family members present. Pt drowsy at arrival with minimal words per breath. He agreed to participate. He needed modA to roll and get to EOB. Sat EOB for several assessments with sitting balance for all assessments. He agreed to stand and while standing had BM, stood while getting cleaned then walked over to chair. Pt had many lines distractions requiring 2nd asst for mobility. Co-tx wit OT and required 2nd asst while each was performing specific assessments. Multiple lines requires at least 2nd asst so pt may succeed without restrictions and be provided proper safety.  His impairments include decreased functional mobility, decreased balance, decreased strength, decreased safety awareness, increased risk for falls. Pt would benefit from further PT while in house to address these impairments to help improve to prior level of independence. This section established at most recent assessment   PROBLEM LIST (Impairments causing functional limitations):  Decreased Strength  Decreased ADL/Functional Activities  Decreased Transfer Abilities  Decreased Ambulation Ability/Technique  Decreased Balance  Decreased Activity Tolerance  Decreased Pacing Skills  Increased Fatigue  Increased Shortness of Breath  Decreased Flexibility/Joint Mobility   INTERVENTIONS PLANNED: (Benefits and precautions of physical therapy have been discussed with the patient.)  Balance Exercise  Bed Mobility  Gait Training  Home Exercise Program (HEP)  Neuromuscular Re-education/Strengthening  Range of Motion (ROM)  Therapeutic Activites  Therapeutic Exercise/Strengthening  Transfer Training     TREATMENT PLAN: Frequency/Duration: 3 times a week for duration of hospital stay  Rehabilitation Potential For Stated Goals: [facility] (at time of discharge pending progress):    Placement: It is my opinion, based on this patient's performance to date, that Mr. Araceli Grullon may benefit from intensive therapy at an [address] [place] [address] after discharge due to potential to make ongoing and sustainable functional improvement that is of practical value. .  Equipment:   None at this time              HISTORY:   History of Present Injury/Illness (Reason for Referral):   82yoM with PMH significant for scalp melanoma, prior TIA, h/o prostate cancer (with bladder sphincter placement), CAD, HTN who presents with gait disturbance, fall, slurred speech, and weakness. Patient is accompanied by his son who helps provide history as well. Patient has been having worsening weakness for over a week. He began having some L facial droop with drooling and slurred speech as well. He reports that he feels weaker on his L side, including both upper and lower extremities, but L leg weakness is very pronounced. He is alert and interactive. He reports falling recently with some residual pain over his L lower ribs. Past Medical History/Comorbidities:   Mr. Augustin Jordan  has a past medical history of Abnormal cardiovascular function study, Arthritis, ASCVD (arteriosclerotic cardiovascular disease), Bladder neck obstruction (1/7/2014), Bladder neck obstruction (1/7/2014), CAD (coronary artery disease) (2010), Coronary atherosclerosis of native coronary artery (5/10/2013), CVA (cerebral vascular accident) ([facility] [address] Dyslipidemia, Dyslipidemia, H/O prostate cancer (9/30/2013), History of prostate cancer (1995), HLD (hyperlipidemia) (5/10/2013), HTN (hypertension) (5/10/2013), TIA (transient ischemic attack) (2009), Hypertension, Hypertensive heart disease (11/5/2015), Malignant neoplasm of prostate ([facility] [address] (1/7/2014), Malignant neoplasm of prostate ([facility] [address] (1/7/2014), Melanoma ([facility] [address] Nausea & vomiting, Other specified forms of chronic ischemic heart disease (1/7/2014), Other specified forms of chronic ischemic heart disease (1/7/2014), Personal history of prostate cancer, Posttraumatic compression fracture of thoracic vertebra ([facility] [address] (10/29/2015), Retention of urine, unspecified (1/7/2014), Retention of urine, unspecified (1/7/2014), Status post total knee replacement (5/11/2013), Stress incontinence, male (1/7/2014), Stress incontinence, male (1/7/2014), TIA (transient ischemic attack) (5/10/2013), and Urinary incontinence. He also has no past medical history of Difficult intubation, Malignant hyperthermia due to anesthesia, Pseudocholinesterase deficiency, or Unspecified adverse effect of anesthesia.   Mr. Augustin Jordan  has a past surgical history that includes hx radical prostatectomy (1995); hx ptca (2010); hx bladder suspension (12/11); hx knee arthroscopy (Right, 2012); hx knee replacement (Right, 05/2013); pr cardiac surg procedure unlist (02/26/2010); hx hernia repair (Right, 2005, 1998); hx urological (2010); biopsy prostate; and hx malignant skin lesion excision (11/01/2016). Social History/Living Environment:   Home Environment: Private residence  # Steps to Enter: 3  Rails to Enter: No  One/Two Story Residence: One story  Living Alone: No  Support Systems: Spouse/Significant Other/Partner  Patient Expects to be Discharged to[de-identified] Private residence  Current DME Used/Available at Home: Shower chair, Walker, rollator, Commode, bedside  Tub or Shower Type: Tub/Shower combination  Prior Level of Function/Work/Activity:  Pt lives in house with wife and daughter. 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 Number of Personal Factors/Comorbidities that affect the Plan of Care: 3+: HIGH COMPLEXITY   EXAMINATION:   Most Recent Physical Functioning:   Gross Assessment:  AROM: Generally decreased, functional  PROM: Generally decreased, functional  Strength: Generally decreased, functional  Coordination: Generally decreased, functional  Tone: Normal  Sensation: Intact               Posture:     Balance:  Sitting: Impaired  Sitting - Static: Fair (occasional)  Sitting - Dynamic: Fair (occasional)  Standing: Impaired  Standing - Static: Poor  Standing - Dynamic : Poor Bed Mobility:  Rolling: Moderate assistance  Supine to Sit: Moderate assistance  Scooting: Minimum assistance  Wheelchair Mobility:     Transfers:  Sit to Stand: Assist x2;Minimum assistance; Additional time  Stand to Sit: Minimum assistance; Additional time;Assist x2  Bed to Chair: Assist x2;Minimum assistance; Additional time  Gait:            Body Structures Involved:  Nerves  Eyes and Ears  Voice/Speech  Joints  Muscles  Ligaments Body Functions Affected:  Mental  Sensory/Pain  Voice and Speech  Neuromusculoskeletal  Movement Related Activities and Participation Affected:  General Tasks and Demands  Mobility  Self Care  Domestic Life  Community, Social and Civic Life   Number of elements that affect the Plan of Care: 4+: HIGH COMPLEXITY   CLINICAL PRESENTATION:   Presentation: Evolving clinical presentation with unstable and unpredictable characteristics: HIGH COMPLEXITY   CLINICAL DECISION MAKIN [facility] Mobility Inpatient Short Form  How much difficulty does the patient currently have. .. Unable A Lot A Little None   1. Turning over in bed (including adjusting bedclothes, sheets and blankets)? ? 1   ? 2   ? 3   ? 4   2. Sitting down on and standing up from a chair with arms ( e.g., wheelchair, bedside commode, etc.)   ? 1   ? 2   ? 3   ? 4   3. Moving from lying on back to sitting on the side of the bed?   ? 1   ? 2   ? 3   ? 4   How much help from another person does the patient currently need. .. Total A Lot A Little None   4. Moving to and from a bed to a chair (including a wheelchair)? ? 1   ? 2   ? 3   ? 4   5. Need to walk in hospital room? ? 1   ? 2   ? 3   ? 4   6. Climbing 3-5 steps with a railing? ? 1   ? 2   ? 3   ? 4   © , Trustees of [address] [place] [address] [facility], under license to [employer]. All rights reserved      Score:  Initial: 13 Most Recent: X (Date: -- )    Interpretation of Tool:  Represents activities that are increasingly more difficult (i.e. Bed mobility, Transfers, Gait). Medical Necessity:     Skilled intervention continues to be required due to decreased function. Reason for Services/Other Comments:  Patient continues to require skilled intervention due to medical complications and patient unable to attend/participate in therapy as expected  .    Use of outcome tool(s) and clinical judgement create a POC that gives a: Difficult prediction of patient's progress: HIGH COMPLEXITY            TREATMENT:   (In addition to Assessment/Re-Assessment sessions the following treatments were rendered)   Pre-treatment Symptoms/Complaints:  none  Pain: Initial:      Post Session:  0 Assessment/Reassessment only, no treatment provided today    Braces/Orthotics/Lines/Etc:   IV  prakash catheter  O2 Device: Room air  Treatment/Session Assessment:    Response to Treatment:  see above  Interdisciplinary Collaboration:   Physical Therapist  Occupational Therapist  Registered Nurse  After treatment position/precautions:   Up in chair  Call light within reach  RN notified  Family at bedside  Nurse at bedside   Compliance with Program/Exercises: Will assess as treatment progresses  Recommendations/Intent for next treatment session: \"Next visit will focus on advancements to more challenging activities and reduction in assistance provided\".   Total Treatment Duration:  PT Patient Time In/Time Out  Time In: 1325  Time Out: [address], DPT

## 2019-07-21 NOTE — PROGRESS NOTES
Occupational Therapy Note: orders received, chart reviewed, RN reports pt given Haldol about an hour ago for being restless. MD also in room with pt and family discussing prognosis. Will check again later as time ans schedule allows.   Thank you for this referral. Dae Paizr, MS, OTR/L

## 2019-07-21 NOTE — PROGRESS NOTES
Per family request, I personally communicated patient's MRI results to the patient's wife, daughter, and 2 sons who were at bedside. Patient has evidence of metastatic brain lesions. Will continue decadron IV. Will place Oncology consultation (does have outpatient Oncology associated with [facility]) for any further inpatient recommendations. Of note, patient is still altered from Ativan as given for MRI. Due to this, patient cannot manipulate his artificial bladder sphincter. Pt's son reports that Urology has had to deactivate it in the past.  I spoke with Dr. Jasper Gonzalez of Urology, who graciously gave recommendations, and is willing to deactivate artificial sphincter mechanism if needed. Will plan on bladder scan later tonight to access retention. Patient's family has given consent to use restraints if patient remains altered to reduce risk of accidental self-harm.

## 2019-07-21 NOTE — PROGRESS NOTES
TRANSFER - OUT REPORT:    Verbal report given to John Matute RN(name) on Alcira Zelaya  being transferred to 704(unit) for routine progression of care       Report consisted of patients Situation, Background, Assessment and   Recommendations(SBAR). Information from the following report(s) ED Summary, MAR, Med Rec Status and Cardiac Rhythm NSR was reviewed with the receiving nurse. Lines:   Peripheral IV 07/21/19 Left; Lower Arm (Active)   Site Assessment Clean, dry, & intact 7/21/2019 12:30 PM   Phlebitis Assessment 0 7/21/2019 12:30 PM   Infiltration Assessment 0 7/21/2019 12:30 PM   Dressing Status Clean, dry, & intact 7/21/2019 12:30 PM   Dressing Type Tape;Transparent 7/21/2019 12:30 PM   Hub Color/Line Status Pink;Capped;Flushed 7/21/2019 12:30 PM       Peripheral IV 07/21/19 Right Arm (Active)   Site Assessment Clean, dry, & intact 7/21/2019 12:30 PM   Phlebitis Assessment 0 7/21/2019 12:30 PM   Infiltration Assessment 0 7/21/2019 12:30 PM   Dressing Status Clean, dry, & intact 7/21/2019 12:30 PM   Dressing Type Tape;Transparent 7/21/2019 12:30 PM   Hub Color/Line Status Pink; Infusing 7/21/2019 12:30 PM        Opportunity for questions and clarification was provided.       Patient transported with:   Registered Nurse

## 2019-07-21 NOTE — PROGRESS NOTES
07/21/19 1630   Dual Skin Pressure Injury Assessment   Dual Skin Pressure Injury Assessment WDL   Second Care Provider (Based on [address] [place] [address]) Berenice Lucio RN

## 2019-07-21 NOTE — PROGRESS NOTES
Dr Ware Alex in to see pt and family. Talked about pt code status, pt now DNR. Pt not taking po medication at present. Medications updated per MD. Pt still drowsy, does arouse briefly to name.

## 2019-07-21 NOTE — PROGRESS NOTES
Patient was resting peacefully  Several family members were at the bedside  Provided supportive presence to all  This is first time  had met family    Will follow closely due to critical prognosis    Valdo Pfeiffer, staff Werner georges 06, [address]  /   [email or web address]

## 2019-07-21 NOTE — PROGRESS NOTES
Progress Note    Patient: Newton Null MRN: [id number]  SSN: [id number]    YOB: 1935  Age: 82 y.o. Sex: male      Admit Date: 7/19/2019    LOS: 1 day     Mr. Rosalee Casarez is an 82yoM with PMH significant for scalp melanoma with LNs mets on MTX, prior TIA, h/o prostate cancer (with bladder sphincter placement), CAD, HTN, chronic urticaria,  who presents with gait disturbance, fall, slurred speech, and weakness. Patient has been having worsening weakness for over a week. He began having some L facial droop with drooling and slurred speech as well. On ER evaluation, patient has grossly abnormal CT head with evidence of edema with possible evolving infarct. ER MD discussed case with Dr. Fredy Juarez of Neurosurgery who graciously reviewed scans and made recommendations for treatment to continue decadron and keppra. A brain MRI revealed a right lateral basal ganglia and subfrontal masses with vasogenic edema, consistent with metastatic malignant melanoma. Family members informed. They made the patient DNR/DNI. They would not want him to pursue any medical nor surgical management, but the patient is drowsy after iv ativan was given prior MRI. They would like to see what he thinks. Subjective:   More alert today, responds to questions. Improving. Objective:     Vitals:    07/21/19 0530 07/21/19 0544 07/21/19 0547 07/21/19 0600   BP: 189/79 189/79  147/66   Pulse: 83 80  73   Resp: 19   20   Temp:       SpO2: 95%   96%   Weight:   74.8 kg (164 lb 14.5 oz)    Height:            Intake and Output:  Current Shift: No intake/output data recorded. Last three shifts: 07/19 1901 - 07/21 0700  In: 1623.8 [I.V.:1623.8]  Out: 1800 [Urine:1800]    Physical Exam:   GENERAL: drowsy but arousable   EYE: negative  LYMPHATIC: Cervical, supraclavicular, and axillary nodes normal.   THROAT & NECK: normal and no erythema or exudates noted.    LUNG: clear to auscultation bilaterally  HEART: regular rate and rhythm, S1, S2 normal, no murmur, click, rub or gallop  ABDOMEN: soft, non-tender. Bowel sounds normal. No masses,  no organomegaly  EXTREMITIES:  extremities normal, atraumatic, no cyanosis or edema  SKIN: Normal.  NEUROLOGIC: drowsy, mild facial droop; left sided weakness > R; no pronator drift. Decreased sensitivity over left side. Oriented x 3. Lab/Data Review: All lab results for the last 24 hours reviewed. 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 Family would like oncologic opinion   He was made DNR/DNI   Palliative care consult tomorrow     -Altered mental status:   Obvious after iv ativan was given prior MRI yesterday  Started to wear off already  Monitor     -HTN: resume home meds     -Hx prostate cancer + artificial bladder sphincter         DVT ppx: GCS    Code status: DNR/DNI.  CONFIRMED TODAY WITH FAMILY MEMBERS     Disposition: transfer to medicine floors     Signed By: Judy Crane MD     July 21, 2019

## 2019-07-21 NOTE — PROGRESS NOTES
PT in to work with pt. Pt given bath with linen change and is now sitting up in recliner. Pt was asked to stay up for one hour if possible.

## 2019-07-21 NOTE — PROGRESS NOTES
Problem: Self Care Deficits Care Plan (Adult)  Goal: *Acute Goals and Plan of Care (Insert Text)  Description  1. Patient will feed self entire meal with B hands and adaptive utensils as needed. 2. Patient will complete upper body dressing and bathing with min A and adaptive equipment as needed. 3. Patient will participate in BUE therapeutic exercises to increase strength in L side to at least 4/5 for participation in ADLs and functional transfers. 4. Patient will participate in [address] [place] [address] therapeutic activities to increase coordination in L UE to [facility] for bimanual fine motor ADLs. 5. Patient will attend to L side for 100% of treatment session with less than 3 verbal cues from therapist.   6. Patient will complete functional transfers with CGA and adaptive equipment as needed. Timeframe: 7 visits    Outcome: Progressing Towards Goal       OCCUPATIONAL THERAPY: Initial Assessment, Daily Note and PM   7/21/2019  INPATIENT: OT Visit Days: 1  [place] / [place] / Product Type: Medicare /      NAME/AGE/GENDER: Kd Lilly is a 82 y.o. male   PRIMARY DIAGNOSIS:  Weakness [R53.1]  Abnormal head CT [R93.0] Abnormal head CT   Abnormal head CT          ICD-10: Treatment Diagnosis:    Generalized Muscle Weakness (M62.81)  Other lack of cordination (R27.8)  Difficulty in walking, Not elsewhere classified (R26.2)  Hemiplegia and hemiparesis following cerebral infarction affecting left non-dominant side (U22.385)   Precautions/Allergies:    Falls, Ace inhibitors and Arb-angiotensin receptor antagonist      ASSESSMENT:     Mr. Dung Mcnulty is 83 YO R dominant WM admitted to the hospital with 2 week history of blurry vision, L facial droop, L sided weakness and decreased balance. Has had at least 2 falls recently 4 the last 6 months. PMH pt has artificial bladder sphincter. Imaging found R lateral Basal ganglia infarcts as well as brain mets.   Pt seen in CCU this afternoon with PT staff after RN asked therapy to HOLD this am due to pt receiving Ativan and Haldol over night and remained groggy. More alert this afternoon and RN cleared pt to get up to recliner. Judah Carson and Francisco Fairchild present upon contact with pt asleep. Pt easily awoken, but most history gotten from family. Pt A &O x4, missing only Sun not Sat for day. Pt did prefer to keep his eyes closed but would open them to sound, his name and on command as well as spontaneously. Pt lives in 1 level home with 3 steps at the entrance with his wife and their daughter Sinai Ballard. Samaria and Pt are CG for the wife/mom who has advanced dementia. Pt has a rollator but refuses to use. Has shower chair in T/S and pt was independent with his own self care until the last few weeks when his balance got bad and he has needed more help. Have BSC for pt's wife. Pt was driving, doing housework, cleaning, yard work, very active until recently. Bed mobility was mod A to Max to his R strong side. L side had trouble coming around and working. Educated on weight shifts but still was Max A scooting. R UE is WFLs for basic self care tasks and strong for age. Pt's L UE is weak and got through most of the range but with drift and awkward movements. Trouble with IR behind the back and end ranges of shoulder movement. B hands and arms are full of fragile skin and healing bruises in various stages. Finger opposition good, Kieran slow and decreased AROM L UE compared to R UE.  Grasps about equal. Pt good static sitting balance, fair dynamic. Sit to stand Min A x2, standing balance fair, noted pt had BM onto floor, though he was more concerned with voiding. Has catheter in place but did not seem to understand. Pt continued to have BM while standing so bedpan placed under pt while standing and he had small BM in bedpan. CNA cleaned pt while therapists held pt min A 2 upright. CNA then began changing bed linens and Pt ambulated 5 steps over to recliner to sit up.   Pt left sitting up right with feet hanging down, more interactive and talking with family which now included his wife and dtr Chadwick Trujillo as well as Granddtr. Pt is functioning well below his baseline and could benefit from skilled OT to maximize his independence, activity tolerance and safety with self care and functional mobility. Will need further therapy and family support if they decided to take pt home versus hospice house. Family still deciding their plan of action given prognosis. Co-treatment with PT was necessary due to his complex involvement and need for 2 skilled people to help with transfers and to improve patient's cognitive participation, ability to follow higher level commands, ability to increase activity demands and ability to return to normal functional activity. This section established at most recent assessment   PROBLEM LIST (Impairments causing functional limitations):  Decreased Strength  Decreased ADL/Functional Activities  Decreased Transfer Abilities  Decreased Ambulation Ability/Technique  Decreased Balance  Increased Pain  Decreased Activity Tolerance  Decreased Pacing Skills  Decreased Work Simplification/Energy Conservation Techniques  Increased Fatigue  Increased Shortness of Breath  Decreased Flexibility/Joint Mobility  Edema/Girth  Decreased Knowledge of Precautions  Decreased Skin Integrity/Hygeine  Decreased Cognition   INTERVENTIONS PLANNED: (Benefits and precautions of occupational therapy have been discussed with the patient.)  Activities of daily living training  Adaptive equipment training  Balance training  Clothing management  Cognitive training  Community reintergration  Donning&doffing training  Group therapy  Re-evaluation  Therapeutic activity  Therapeutic exercise     TREATMENT PLAN: Frequency/Duration: Follow patient 3x per week to address above goals.   Rehabilitation Potential For Stated Goals: Good     REHAB RECOMMENDATIONS (at time of discharge pending progress): Placement: It is my opinion, based on this patient's performance to date, that Mr. Adiel Wesley may benefit from participating in 1-2 additional therapy sessions in order to continue to assess for rehab potential and then make recommendation for disposition at discharge. Equipment:   TBD based on family wishes for DC plan               OCCUPATIONAL PROFILE AND HISTORY:   History of Present Injury/Illness (Reason for Referral):  See H&P  Past Medical History/Comorbidities:   Mr. Adiel Wesley  has a past medical history of Abnormal cardiovascular function study, Arthritis, ASCVD (arteriosclerotic cardiovascular disease), Bladder neck obstruction (1/7/2014), Bladder neck obstruction (1/7/2014), CAD (coronary artery disease) (2010), Coronary atherosclerosis of native coronary artery (5/10/2013), CVA (cerebral vascular accident) ([facility] [address] Dyslipidemia, Dyslipidemia, H/O prostate cancer (9/30/2013), History of prostate cancer (1995), HLD (hyperlipidemia) (5/10/2013), HTN (hypertension) (5/10/2013), TIA (transient ischemic attack) (2009), Hypertension, Hypertensive heart disease (11/5/2015), Malignant neoplasm of prostate ([address] (1/7/2014), Malignant neoplasm of prostate ([address] (1/7/2014), Melanoma ([address] Nausea & vomiting, Other specified forms of chronic ischemic heart disease (1/7/2014), Other specified forms of chronic ischemic heart disease (1/7/2014), Personal history of prostate cancer, Posttraumatic compression fracture of thoracic vertebra ([address] (10/29/2015), Retention of urine, unspecified (1/7/2014), Retention of urine, unspecified (1/7/2014), Status post total knee replacement (5/11/2013), Stress incontinence, male (1/7/2014), Stress incontinence, male (1/7/2014), TIA (transient ischemic attack) (5/10/2013), and Urinary incontinence. He also has no past medical history of Difficult intubation, Malignant hyperthermia due to anesthesia, Pseudocholinesterase deficiency, or Unspecified adverse effect of anesthesia.   Mr. Adiel Wesley has a past surgical history that includes hx radical prostatectomy (1995); hx ptca (2010); hx bladder suspension (12/11); hx knee arthroscopy (Right, 2012); hx knee replacement (Right, 05/2013); pr cardiac surg procedure unlist (02/26/2010); hx hernia repair (Right, 2005, 1998); hx urological (2010); biopsy prostate; and hx malignant skin lesion excision (11/01/2016). Social History/Living Environment:   Home Environment: Private residence  # Steps to Enter: 3  Rails to Enter: No  One/Two Story Residence: One story  Living Alone: No(daughter lives with pt and pt's wife as CG)  Support Systems: Spouse/Significant Other/Partner, Child(sandor), Family member(s)  Patient Expects to be Discharged to[de-identified] Private residence  Current DME Used/Available at Home: Shower chair, Walker, rollator, Commode, bedside  Tub or Shower Type: Tub/Shower combination  Prior Level of Function/Work/Activity:  Has had at least 2 falls recently 4 the last 6 months. Sons Alli and Daljit present upon contact with pt asleep. Pt lives in 1 level home with 3 steps at the entrance with his wife and their daughter Popeye. Samaria and Pt are CG for the wife/mom who has advanced dementia. Pt has a rollator but refuses to use. Has shower chair in T/S and pt was independent with his own self care until the last few weeks when his balance got bad and he has needed more help. Have BSC for pt's wife. Pt was driving, doing housework, cleaning, yard work, very active until recently. Dominant Side:         RIGHT   Number of Personal Factors/Comorbidities that affect the Plan of Care: Extensive review of physical, cognitive, and psychosocial performance (3+):  HIGH COMPLEXITY   ASSESSMENT OF OCCUPATIONAL PERFORMANCE[de-identified]   Activities of Daily Living:   Basic ADLs (From Assessment) Complex ADLs (From Assessment)   Feeding: Setup, Additional time  Oral Facial Hygiene/Grooming: Moderate assistance  Bathing:  Moderate assistance, Maximum assistance  Upper Body Dressing: Moderate assistance, Maximum assistance  Lower Body Dressing: Maximum assistance, Total assistance  Toileting: Total assistance, Adaptive equipment(prakash in place) Instrumental ADL  Meal Preparation: Maximum assistance(Pt has been CG for wife with dementia, dtr works)  Homemaking: Maximum assistance(Pt has been CG for wife with dementia)  Medication Management: Maximum assistance(pt has been CG for his wife with dementia)  Financial Management: Maximum assistance(Pt has been CG for wife with dementia)   Grooming/Bathing/Dressing Activities of Daily Living     Cognitive Retraining  Safety/Judgement: Decreased awareness of need for assistance;Decreased awareness of need for safety;Decreased insight into deficits; Fall prevention;Decreased awareness of environment                 Functional Transfers  Bathroom Mobility: Moderate assistance  Toilet Transfer : Moderate assistance(from lower surface)  Tub Transfer: Total assistance  Shower Transfer: Moderate assistance     Bed/Mat Mobility  Rolling: Moderate assistance  Supine to Sit: Moderate assistance  Sit to Supine: (up to recliner and remaided there)  Sit to Stand: Assist x2;Minimum assistance  Stand to Sit: Assist x2;Minimum assistance  Bed to Chair: Assist x2;Minimum assistance  Scooting: Moderate assistance     Most Recent Physical Functioning:   Gross Assessment:  AROM: Generally decreased, functional(especially at B shoulders)  PROM: Generally decreased, functional  Strength: Generally decreased, functional  Coordination: Generally decreased, functional  Tone: Normal  Sensation: Intact(multiple bruises in various healing stages)               Posture:     Balance:  Sitting: Impaired  Sitting - Static: Fair (occasional)  Sitting - Dynamic: Fair (occasional); Poor (constant support)  Standing: Impaired;Pull to stand; With support  Standing - Static: Constant support; Fair  Standing - Dynamic : Poor;Constant support Bed Mobility:  Rolling:  Moderate assistance  Supine to Sit: Moderate assistance  Sit to Supine: (up to recliner and remaided there)  Scooting:  Moderate assistance  Wheelchair Mobility:     Transfers:  Sit to Stand: Assist x2;Minimum assistance  Stand to Sit: Assist x2;Minimum assistance  Bed to Chair: Assist x2;Minimum assistance            Patient Vitals for the past 6 hrs:   BP BP Patient Position SpO2 Pulse   07/21/19 0830 146/70 -- 93 % 73   07/21/19 0901 181/78 -- 98 % 79   07/21/19 0911 153/87 -- 97 % 79   07/21/19 0931 161/87 -- 95 % 82   07/21/19 1000 166/77 -- 96 % 79   07/21/19 1030 142/83 -- 95 % 74   07/21/19 1100 143/71 -- 92 % 72   07/21/19 1130 148/72 -- 94 % 73   07/21/19 1200 155/79 -- 96 % 84   07/21/19 1230 157/75 At rest 96 % 76       Mental Status  Neurologic State: Alert, Drowsy, Eyes open to stimulus, Eyes open to pain, Eyes open spontaneously, Eyes open to voice  Orientation Level: Oriented to person, Oriented to place, Oriented to situation, Disoriented to time(did not get day, Sat (Sun) July 21)  Cognition: Follows commands, Decreased attention/concentration  Perception: Cues to maintain midline in sitting, Cues to maintain midline in standing, Tactile, Verbal, Visual  Perseveration: No perseveration noted  Safety/Judgement: Decreased awareness of need for assistance, Decreased awareness of need for safety, Decreased insight into deficits, Fall prevention, Decreased awareness of environment                          Physical Skills Involved:  Range of Motion  Balance  Strength  Activity Tolerance  Pain (acute)  Pain (Chronic)  Edema  Skin Integrity Cognitive Skills Affected (resulting in the inability to perform in a timely and safe manner):  Perception  Executive Function  Short Term Recall  Sustained Attention  Divided Attention  Comprehension Psychosocial Skills Affected:  Habits/Routines  Environmental Adaptation  Social Interaction  Emotional Regulation  Self-Awareness  Awareness of Others  Social Roles   Number of elements that affect the Plan of Care: 5+:  HIGH COMPLEXITY   CLINICAL DECISION MAKING:   [facility] AM-PAC 6 Clicks   Daily Activity Inpatient Short Form  How much help from another person does the patient currently need. .. Total A Lot A Little None   1. Putting on and taking off regular lower body clothing? ? 1   ? 2   ? 3   ? 4   2. Bathing (including washing, rinsing, drying)? ? 1   ? 2   ? 3   ? 4   3. Toileting, which includes using toilet, bedpan or urinal?   ? 1   ? 2   ? 3   ? 4   4. Putting on and taking off regular upper body clothing? ? 1   ? 2   ? 3   ? 4   5. Taking care of personal grooming such as brushing teeth? ? 1   ? 2   ? 3   ? 4   6. Eating meals? ? 1   ? 2   ? 3   ? 4   © 2007, Trustees of [facility], under license to [employer]. All rights reserved      Score:  Initial: 14, completed 7/21/2019 Most Recent: X (Date: -- )    Interpretation of Tool:  Represents activities that are increasingly more difficult (i.e. Bed mobility, Transfers, Gait). Medical Necessity:     Patient demonstrates good   rehab potential due to higher previous functional level. Reason for Services/Other Comments:  Patient continues to require skilled intervention due to s/p above and decreased ADLs and mobility   . Use of outcome tool(s) and clinical judgement create a POC that gives a: MODERATE COMPLEXITY         TREATMENT:   (In addition to Assessment/Re-Assessment sessions the following treatments were rendered)     Pre-treatment Symptoms/Complaints:  \"I think I can sit up for a while. \"  Pain: Initial:   Pain Intensity 1: 0  Post Session:  no complaint of pain,  just tired     Today's treatment session addressed Decreased Strength, Decreased ADL/Functional Activities, Decreased Transfer Abilities, Decreased Ambulation Ability/Technique, Decreased Balance, Decreased Activity Tolerance, Decreased Pacing Skills, Decreased Work Simplification/Energy Conservation Techniques, Increased Fatigue, Increased Shortness of Breath, Decreased Flexibility/Joint Mobility, Decreased Knowledge of Precautions, Decreased Skin Integrity/Hygeine and Decreased Cognition to progress towards achieving goal(s) all goals . During this session,  Physical Therapy addressed  Functional Transfers to progress towards their discipline specific goal(s). Co-treatment was necessary to improve patient's cognitive participation, ability to follow higher level commands, ability to increase activity demands and ability to return to normal functional activity. Self Care: (20 mins): Procedure(s) (per grid) utilized to improve and/or restore self-care/home management as related to dressing, bathing, toileting, grooming and self feeding. Required maximal visual, verbal, manual, tactile, physical assist and   cueing to facilitate activities of daily living skills and compensatory activities. Evaluation: 17 mins     Braces/Orthotics/Lines/Etc:   IV  prakash catheter  CCU monitoring   O2 Device: Room air  Treatment/Session Assessment:    Response to Treatment:  no adverse reaction  Interdisciplinary Collaboration:   Physical Therapist  Occupational Therapist  Registered Nurse  Certified Nursing Assistant/Patient Care Technician  After treatment position/precautions:   Up in chair  Bed/Chair-wheels locked  Bed in low position  Call light within reach  RN notified  Family at bedside  B feet dangling    Compliance with Program/Exercises: Compliant most of the time. Recommendations/Intent for next treatment session: \"Next visit will focus on advancements to more challenging activities and reduction in assistance provided\".   Total Treatment Duration:  37 mins  OT Patient Time In/Time Out  Time In: 1325  Time Out: 1402     CHRISTOPHE Padilla, MS, OTR/L

## 2019-07-21 NOTE — CONSULTS
[address]    Name:  Bhargav Gonsales  MR#:  [id number]  :  1935  ACCOUNT #:  [de-identified]  DATE OF SERVICE:  2019    UROLOGY CONSULT NOTE    INDICATION FOR CONSULTATION:  Urinary retention. HISTORY OF PRESENT ILLNESS:  This is an 82year-old gentleman, who is status post radical prostatectomy in  followed by salvage radiation therapy in . He continued to report significant male stress urinary incontinence and underwent an AdVance male sling in 2011 by Dr. Mirela Martin. He did not see much improvement from this and subsequently underwent an artificial urinary sphincter, which was placed on 2013. He has done exceptional in regards to his incontinence following this procedure. He has recently been diagnosed with cranial edema versus metastatic disease with deteriorating mental status. He is now unable to cycle his sphincter and a recent postvoid residual was 694 mL by ultrasound. We were asked to further evaluate. PAST MEDICAL HISTORY:  As above. Coronary artery disease, CVA, dyslipidemia, prostate cancer, hypertension, melanoma. PAST SURGICAL HISTORY:  As above. Cardiac stents, inguinal hernia repair, right knee arthroscopy, right knee replacement, melanoma excision. ALLERGIES:  ACE INHIBITORS AND ANGIOTENSIN RECEPTOR ANTAGONIST. SOCIAL HISTORY:  . Former smoker, although quit in [address] [place] [address]. No recent alcohol or tobacco use. REVIEW OF SYSTEMS:  Essentially unobtainable given the patient's obtunded state. PHYSICAL EXAMINATION:  VITAL SIGNS:  Temperature is 97.4, pulse 75, respirations 17, blood pressure 136/73. GENERAL:  He is obtunded and confused. ABDOMENS:  His abdomen is soft with mild suprapubic distention noted. :  Examination is unremarkable. His AUS pump is present in the scrotum. EXTREMITIES:  No clubbing, cyanosis, or edema. LABORATORY DATA:  Pertinent labs: White count is 7.2, creatinine is 0.88.   Again, he has had no recent urinalyses and he has had no recent upper tract imaging. ASSESSMENT:  Urinary retention secondary to mental status changes. PLAN:  I deactivated his artificial urinary sphincter and placed a 14-Amharic Hernandez catheter at bedside. We will hopefully plan to leave this temporarily to prevent erosion. We will notify Dr. Hoa Grey of this patient's admission.       [address] [facility], DO      [id number]  D:  07/21/2019 9:38  T:  07/21/2019 9:40  JOB #:  [id number]

## 2019-07-21 NOTE — PROGRESS NOTES
NEUROSURGERY PROGRESS NOTE:   Admit Date: 7/19/2019  Subjective:   No acute overnight events. Patient lethargic from sedation given yesterday to acquire the MRI Brain WWO contrast.     Objective:  Visit Vitals  /66   Pulse 73   Temp 97.4 °F (36.3 °C)   Resp 20   Ht 5' 10\" (1.778 m)   Wt 164 lb 14.5 oz (74.8 kg)   SpO2 96%   BMI 23.66 kg/m²     General: No acute distress, sleeping but arouses to voice and stimulation   Awake, alert, and oriented to person, place, family and year   Eyes open spontaneously and to voice   PERRL, EOMI  Hearing grossly intact   Mild left facial droop   Left drift   Follows simples commands in all four extremities with greater than anti-gravity strength   Does not cooperate with formal motor assessment today      Assessment and Plan:   Colleen Alcocer y. o. male presented to [facility] with a PMH of TIA, CAD, and malignant melanoma to the scalp following onset of left facial weakness, left sided weakness and problems with balance over days to weeks. I have independently reviewed his MRI Brain WWO contrast that demonstrates a 3.5 cm hetergeneously enhancing of the right lateral basal ganglia and right anterior and inferior frontal mass measuring 2.1 cm. I have discussed his care with his daughter and his son at the bedside and explained that surgery for resection would require a combined plastic surgery and neurosurgery approach given his history of scalp resection and has significant risks associated with surgery. His family states that he does not even want chemotherapy or radiation at his age and as such would not want to undergo a craniotomy or extensive neurosurgical procedure. I discussed the need for the family to think about all that is going on and consider engaging palliative care services as he has Stage IV metastatic melanoma and does not want aggressive intervention. The family understands and is at peace with their father's wishes.  No acute neurosurgical intervention necessary at this time. No neurosurgical follow-up necessary at this point. Rain Mcgarry.  Jerry Obrien, [address] [facility]

## 2019-07-21 NOTE — ROUTINE PROCESS
TRANSFER - IN REPORT:    Verbal report received from [facility] on Mickie Ripple  being received from ICU(unit) for routine progression of care      Report consisted of patients Situation, Background, Assessment and   Recommendations(SBAR). Information from the following report(s) SBAR was reviewed with the receiving nurse. Opportunity for questions and clarification was provided. Assessment completed upon patients arrival to unit and care assumed.

## 2019-07-22 PROBLEM — R41.0 DELIRIUM: Status: ACTIVE | Noted: 2019-01-01

## 2019-07-22 NOTE — PROGRESS NOTES
Problem: Falls - Risk of  Goal: *Absence of Falls  Description  Document Elizabeth Huston Fall Risk and appropriate interventions in the flowsheet.   Outcome: Progressing Towards Goal  Note:   Fall Risk Interventions:  Mobility Interventions: Bed/chair exit alarm    Mentation Interventions: Adequate sleep, hydration, pain control    Medication Interventions: Evaluate medications/consider consulting pharmacy    Elimination Interventions: Bed/chair exit alarm              Problem: Delirium Treatment  Goal: *Level of consciousness restored to baseline  Outcome: Progressing Towards Goal     Problem: Patient Education: Go to Patient Education Activity  Goal: Patient/Family Education  Outcome: Progressing Towards Goal     Problem: Patient Education: Go to Patient Education Activity  Goal: Patient/Family Education  Outcome: Progressing Towards Goal

## 2019-07-22 NOTE — PROGRESS NOTES
[facility]ITICAL CARE OUTREACH NURSE PROGRESS REPORT      SUBJECTIVE: Called to assess patient secondary to transfer from CCU. MEWS Score: 1 (07/22/19 0001)  Vitals:    07/21/19 1500 07/21/19 1630 07/21/19 2001 07/22/19 0001   BP: 151/70 162/76 128/63 153/90   Pulse: 75 80 84 74   Resp: 14 16 20 20   Temp:  97.4 °F (36.3 °C) 97.8 °F (36.6 °C) 97.4 °F (36.3 °C)   SpO2: 95% 90% 93% 98%   Weight:       Height:          LAB DATA:    Recent Labs     07/21/19 0328 07/20/19 0348 07/19/19  2259    143 141   K 3.5 3.0* 3.7    107 103   CO2 26 26 27   AGAP 11 10 11   * 113* 156*   BUN 8 9 11   CREA 0.75* 0.88 1.44   GFRAA >60 >60 >60   GFRNA >60 >60 50*   CA 8.7 8.5 9.4   MG  --   --  2.0   PHOS  --   --  2.3   ALB  --   --  4.0   TP  --   --  7.1   GLOB  --   --  3.1   AGRAT  --   --  1.3   ALT  --   --  33        Recent Labs     07/21/19 0328 07/20/19 0348 07/19/19  2259   WBC 12.6* 7.2 9.5   HGB 13.9 12.5* 15.0   HCT 41.8 36.9* 43.8    203 248          OBJECTIVE: On arrival to room, I found patient to be in bed with family at bedside. Pain Assessment  Pain Intensity 1: 0 (07/21/19 2000)        Patient Stated Pain Goal: 0      ASSESSMENT:  Primary RN at bedside, patient is agitated. No signs of distress. Patient discussed with primary RN and she has no concerns. PLAN:  Will continue to follow per outreach protocol.

## 2019-07-22 NOTE — PROGRESS NOTES
Inpatient Hematology / Oncology Daily Progress Note    Reason for Consult:  Weakness [R53.1]; Abnormal head CT [R93.0]  Referring Physician:  Magaly Ritchie MD    24 Hour Events:  Afebrile,  VSS  Awaiting Hospice consult  Family at bedside    ROS:  Pt lethargic/somnolent. 10 point review of systems is otherwise negative with the exception of the elements mentioned above in the HPI.        Allergies   Allergen Reactions    Ace Inhibitors Angioedema    Arb-Angiotensin Receptor Antagonist Angioedema     Past Medical History:   Diagnosis Date    Abnormal cardiovascular function study     Arthritis     generalized osteo    ASCVD (arteriosclerotic cardiovascular disease)     Bladder neck obstruction 1/7/2014    Bladder neck obstruction 1/7/2014    CAD (coronary artery disease) 2010    stent x 3    Coronary atherosclerosis of native coronary artery 5/10/2013    CVA (cerebral vascular accident) ([address]     Dyslipidemia     managed with medication     Dyslipidemia     H/O prostate cancer 9/30/2013    History of prostate cancer 1995    TURP with radiation     HLD (hyperlipidemia) 5/10/2013    HTN (hypertension) 5/10/2013    Hx-TIA (transient ischemic attack) 2009    no residual effects    Hypertension     controlled with med    Hypertensive heart disease 11/5/2015    Malignant neoplasm of prostate ([address] 1/7/2014    Malignant neoplasm of prostate ([address] 1/7/2014    Melanoma ([address]     Nausea & vomiting     Other specified forms of chronic ischemic heart disease 1/7/2014    Other specified forms of chronic ischemic heart disease 1/7/2014    Personal history of prostate cancer     Posttraumatic compression fracture of thoracic vertebra ([address] 10/29/2015    Retention of urine, unspecified 1/7/2014    Retention of urine, unspecified 1/7/2014    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    Stress incontinence, male 1/7/2014    Stress incontinence, male 1/7/2014    TIA (transient ischemic attack) 5/10/2013    Urinary incontinence      Past Surgical History:   Procedure Laterality Date    BIOPSY PROSTATE      CARDIAC SURG PROCEDURE UNLIST  2010    CARDIAC STENT     HX BLADDER SUSPENSION      male sling    HX HERNIA REPAIR Right ,     inguinal repair    HX KNEE ARTHROSCOPY Right     right    HX KNEE REPLACEMENT Right 2013    HX MALIGNANT SKIN LESION EXCISION  2016    top of head -Melanoma    HX PTCA  2010    3 cardiac stents, mid and distal LAD, mid Cx    HX RADICAL PROSTATECTOMY      HX UROLOGICAL      MALE SLING      Family History   Problem Relation Age of Onset    Cancer Mother     Heart Disease Father 61        MI or CVA-     Stroke Father 61        aphasic    Heart Attack Father     Cancer Brother         prostate CA    Hypertension Brother     Heart Disease Brother      Social History     Socioeconomic History    Marital status:      Spouse name: Not on file    Number of children: Not on file    Years of education: Not on file    Highest education level: Not on file   Occupational History    Not on file   Social Needs    Financial resource strain: Not on file    Food insecurity:     Worry: Not on file     Inability: Not on file    Transportation needs:     Medical: Not on file     Non-medical: Not on file   Tobacco Use    Smoking status: Former Smoker     Packs/day: 1.50     Years: 30.00     Pack years: 45.00     Last attempt to quit: 1985     Years since quittin.2    Smokeless tobacco: Never Used    Tobacco comment: quit     Substance and Sexual Activity    Alcohol use: No    Drug use: No    Sexual activity: Not on file   Lifestyle    Physical activity:     Days per week: Not on file     Minutes per session: Not on file    Stress: Not on file   Relationships    Social connections:     Talks on phone: Not on file     Gets together: Not on file     Attends Yazidi service: Not on file     Active member of club or organization: Not on file     Attends meetings of clubs or organizations: Not on file     Relationship status: Not on file    Intimate partner violence:     Fear of current or ex partner: Not on file     Emotionally abused: Not on file     Physically abused: Not on file     Forced sexual activity: Not on file   Other Topics Concern    Not on file   Social History Narrative    Not on file     Current Facility-Administered Medications   Medication Dose Route Frequency Provider Last Rate Last Dose    alcohol 62% (NOZIN) nasal  1 Ampule  1 Ampule Topical Q12H Aguilar Spicer MD   1 Ampule at 07/22/19 1229    metoprolol (LOPRESSOR) injection 2.5 mg  2.5 mg IntraVENous Q4H PRN Leonora Stone MD   2.5 mg at 07/21/19 0544    famotidine (PF) (PEPCID) 20 mg in sodium chloride 0.9% 10 mL injection  20 mg IntraVENous Q12H Aguilar Spicer MD   20 mg at 07/22/19 0806    oxyCODONE IR (ROXICODONE) tablet 5 mg  5 mg Oral Q4H PRN Leonora Stone MD        simvastatin (ZOCOR) tablet 20 mg  20 mg Oral 7am Leonora Stone MD   20 mg at 07/22/19 0533    sodium chloride (NS) flush 5-40 mL  5-40 mL IntraVENous Q8H Leonora Stone MD   5 mL at 07/22/19 0535    sodium chloride (NS) flush 5-40 mL  5-40 mL IntraVENous PRN Leonora Stone MD        acetaminophen (TYLENOL) tablet 650 mg  650 mg Oral Q4H PRN Leonora Stone MD        ondansetron [facility]) injection 4 mg  4 mg IntraVENous Q4H PRN Leonora Stone MD        dexamethasone (DECADRON) 4 mg/mL injection 4 mg  4 mg IntraVENous Q6H Leonora Stone MD   4 mg at 07/22/19 1229    0.9% sodium chloride infusion  75 mL/hr IntraVENous CONTINUOUS Aguilar Spicer MD 75 mL/hr at 07/22/19 0531 75 mL/hr at 07/22/19 0531    LORazepam (ATIVAN) injection 1 mg  1 mg IntraVENous Q2H PRN Aguilar Spicer MD   1 mg at 07/20/19 1800    levETIRAcetam (KEPPRA) 500 mg in 0.9% sodium chloride 100 mL IVPB  500 mg IntraVENous Q12H Aguilar Spicer  mL/hr at 19 0806 500 mg at 19 0806    haloperidol lactate (HALDOL) injection 5 mg  5 mg IntraVENous Q4H PRN Rita Garcia MD   5 mg at 19 0856       OBJECTIVE:  Patient Vitals for the past 8 hrs:   BP Temp Pulse Resp SpO2   19 1125 143/84 97.2 °F (36.2 °C) 76 18 93 %   19 0742 100/50 98 °F (36.7 °C) 78 17 100 %     Temp (24hrs), Av.6 °F (36.4 °C), Min:97.2 °F (36.2 °C), Max:98 °F (36.7 °C)    No intake/output data recorded. Physical Exam:  Constitutional: Weak elderly  male in no acute distress, lying comfortably in the hospital bed, eyes closed. HEENT: Normocephalic and atraumatic. Oropharynx is clear, mucous membranes are moist.  Neck supple without JVD. No thyromegaly present. Skin Warm and dry. Bruising noted on BUE and no rash noted. No erythema. No pallor. Respiratory Lungs are clear to auscultation bilaterally without wheezes, rales or rhonchi, normal air exchange without accessory muscle use. CVS Normal rate, regular rhythm and normal S1 and S2. No murmurs, gallops, or rubs. Abdomen Soft, nontender and nondistended, normoactive bowel sounds. No palpable mass. No hepatosplenomegaly. Neuro Grossly nonfocal with no obvious sensory or motor deficits. MSK Normal range of motion in general.  No edema and no tenderness. Psych Calm, cooperative.         Labs:    Recent Results (from the past 24 hour(s))   METABOLIC PANEL, BASIC    Collection Time: 19  5:01 AM   Result Value Ref Range    Sodium 146 (H) 136 - 145 mmol/L    Potassium 3.8 3.5 - 5.1 mmol/L    Chloride 110 (H) 98 - 107 mmol/L    CO2 26 21 - 32 mmol/L    Anion gap 10 7 - 16 mmol/L    Glucose 146 (H) 65 - 100 mg/dL    BUN 12 8 - 23 MG/DL    Creatinine 0.78 (L) 0.8 - 1.5 MG/DL    GFR est AA >60 >60 ml/min/1.73m2    GFR est non-AA >60 >60 ml/min/1.73m2    Calcium 8.3 8.3 - 10.4 MG/DL   CBC WITH AUTOMATED DIFF    Collection Time: 19  5:01 AM   Result Value Ref Range    WBC 13.1 (H) 4.3 - 11.1 K/uL    RBC 4.15 (L) 4.23 - 5.6 M/uL    HGB 13.1 (L) 13.6 - 17.2 g/dL    HCT 38.1 (L) 41.1 - 50.3 %    MCV 91.8 79.6 - 97.8 FL    MCH 31.6 26.1 - 32.9 PG    MCHC 34.4 31.4 - 35.0 g/dL    RDW 13.5 11.9 - 14.6 %    PLATELET 523 224 - 391 K/uL    MPV 10.0 9.4 - 12.3 FL    ABSOLUTE NRBC 0.00 0.0 - 0.2 K/uL    NEUTROPHILS 92 (H) 43 - 78 %    LYMPHOCYTES 4 (L) 13 - 44 %    MONOCYTES 4 4.0 - 12.0 %    EOSINOPHILS 0 (L) 0.5 - 7.8 %    BASOPHILS 0 0.0 - 2.0 %    IMMATURE GRANULOCYTES 0 0.0 - 5.0 %    ABS. NEUTROPHILS 12.1 (H) 1.7 - 8.2 K/UL    ABS. LYMPHOCYTES 0.5 0.5 - 4.6 K/UL    ABS. MONOCYTES 0.5 0.1 - 1.3 K/UL    ABS. EOSINOPHILS 0.0 0.0 - 0.8 K/UL    ABS. BASOPHILS 0.0 0.0 - 0.2 K/UL    ABS. IMM. GRANS. 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 [facility]) ICD-10-CM: C79.31  ICD-9-CM: 198.3  7/21/2019        Vasogenic brain edema ([facility] ICD-10-CM: G93.6  ICD-9-CM: 348.5  7/21/2019        Weakness ICD-10-CM: R53.1  ICD-9-CM: 780.79  7/20/2019        * (Principal) Abnormal head CT ICD-10-CM: R93.0  ICD-9-CM: 793.0  7/20/2019        Chronic urticaria ICD-10-CM: L50.8  ICD-9-CM: 708.8  12/7/2017        Malignant melanoma of scalp ([address] ICD-10-CM: C43.4  ICD-9-CM: 172.4  11/9/2016        Malignant neoplasm of prostate ([address] ICD-10-CM: C61  ICD-9-CM: 185  11/8/2016        Hx-TIA (transient ischemic attack) ICD-10-CM: Z86.73  ICD-9-CM: V12.54  5/17/2016    Overview Addendum 6/13/2016  7:37 AM by Mell Godinez MD     TIA occurred with slurred speech and arm numbness in February 2010 prior to his abnormal stress test and PCI, evaluated by Keny Rodriguez and Virgen.    May 2016 - < 50% stenosis bilaterally             Male stress incontinence ICD-10-CM: N39.3  ICD-9-CM: 788.32  11/9/2015        Edema ICD-10-CM: R60.9  ICD-9-CM: 782.3  11/5/2015 Hypertensive heart disease ICD-10-CM: I11.9  ICD-9-CM: 402.90  11/5/2015        S/P kyphoplasty ICD-10-CM: I72.980  ICD-9-CM: V45.89  10/29/2015        Closed fracture of dorsal (thoracic) vertebra without mention of spinal cord injury ICD-10-CM: S22.009A  ICD-9-CM: 805.2  9/16/2015        Dyslipidemia ICD-10-CM: E78.5  ICD-9-CM: 272.4  Unknown        HTN (hypertension) ICD-10-CM: I10  ICD-9-CM: 401.9  5/10/2013        Coronary atherosclerosis of native coronary artery ICD-10-CM: I25.10  ICD-9-CM: 414.01  5/10/2013    Overview Signed 5/17/2016  8:01 AM by Emile Phillips MD     Drug eluting stents to the mid and distal LAD and mid Cx, normal ventricle, 2/7/10             HLD (hyperlipidemia) ICD-10-CM: E78.5  ICD-9-CM: 272.4  5/10/2013            82 male history of hypertension, CAD, TIA, CAD status post stents, prostate cancer status post prostatectomy and XRT 2005 (felt to be in remission since), treated in early 2018 for malignant melanoma, now admitted with slurred speech and gait disturbances. Brain MRI unfortunately is concerning for basal ganglia as well as subfrontal metastasis with associated vasogenic edema. He has since been started on dexamethasone as well as Keppra. Family is at bedside. His oncologic history with respect to malignant melanoma is as follows: Patient was diagnosed with malignant melanoma over his scalp region in early 2018. He is status post surgical resection as well as neck dissection for a positive sentinel lymph node. Post resection, patient unfortunately developed recurrent skin disease around site of his prior resection felt not to be resection margin related. He was given pembrolizumab x5-6 doses (till 8/17) under care of Dr. Rosaura Naik at [facility]. Thereafter this was stopped after patient developed cutaneous toxicity as well as lip swelling. He has since been monitored and disease is felt to be in remission.     7/21 Clinical picture is consistent with metastatic process, likely related to his history of malignant melanoma. Diagnosis and further work-up discussed with multiple children at his bedside. Various options discussed including management going forward including radiation oncology evaluation, as well as CT CAP to assess any systemic relapse elsewhere. PSA can also be checked in such a case (though doubt of prostate origin). Palliative care/hospice also discussed which they are leaning towards. Will get palliative care services to see patient and family to discuss services provided. In interim continue dexamethasone. 7/22 Family with multiple questions regarding PET/CT and radiation. Interested in cyberknife. PET/CT and cyberknife can not be performed while admitted. Family states they will contact his oncologist at [facility] to facilitate the PET and radiation if they choose this route. Also interested in Hospice services. Awaiting Hospice consult. Thank you for allowing us to participate in the care of Mr. Curvin Cushing. He will need to f/u with Dr. Jamee Meyer, his oncologist at [facility], after discharge if he elects to pursue PET/CT and cyberknife. Theresa Nolan NP  [facility]  [address] [place] [address]  Office : [phone]  Fax : [phone]    Attending Addendum:  Patient seen with STEVE Joseph. Mr Curvin Cushing is an 81yo man with PMHx of HTN, CAD s/p stenting, TIA< prostate ca c/p sx/XRT 2005, treated for scalp malignant melanoma in 2018 s/p sx resection and neck dissection fo r+ SLN. He developed recurrent skin disease around site of prior resection. Treated with pembro until 8/2017 under care of Dr Jamee Meyer at [facility] when he developed cutaneous toxicity. Currently admitted for slurred speech and gait abnormalities and found to have brain imaging concerning for metastatic disease / vasogenic edema. Some improvement in symptoms with steroids, also on Keppra. Today, pt opens eyes to verbal stimulus intermittently. Children at bedside. I personally performed a face to face diagnostic evaluation on this patient. My findings are as follows: opens eyes occasionally and answers simple questions intermittently, soft gloves on, lungs clear, heart regular, abdomen benign and no LE edema, scalp deformation from prior surgical interventions. Family with questions regarding XRT and PET. Thinking of f/u at [facility] for further eval for stereotactic radiation. Hospice to meet with family per their wishes. I have reviewed and agree with the care plan. All questions answered to family's satisfaction. They agree with current plan.             Geeta Balderrama MD  [facility]  [address] [place] [address]  Office : [phone]  Fax : [phone]

## 2019-07-22 NOTE — CONSULTS
Palliative Care    Patient: Brenda Constantino MRN: [id number]  SSN: [id number]    YOB: 1935  Age: 82 y.o. Sex: male       Date of Request: 7/21/19  Date of Consult:  7/22/2019  Reason for Consult:  advanced care plan planning/end of life  Requesting Physician: Dr. 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 Discussed case with appropriate providers: Dr. Jimmy Estrada, STEVE Tomlinson, STEVE Moeller, Dr. Merlin Dibbles laboratory and x-ray data from admission to present. Pt resting in bed, somnolent. At bedside are dtr Del López, one of the pt's two sons, the son's wife, the pt's wife, and a brother. The pt endorsed some discomfort in the right side of his neck, where the surgery and lymph node dissection were done several years ago. He denied N/V, but was too sleepy to participate in a discussion of his goals of care. The family reported that the pt has previously made it clear that he does not want chemotherapy or surgery. The family have decided that they would like a PET scan to be done. They understand that this scan is not likely to change treatment options, but they feel that they would like to know the full extent of the disease. Concerning possible radiation to the brain, the family would like to know how many visits would be required, and if these radiation visits could be from the hospital, as they would not be able to get the pt in and out of the care to take him from home.  Family members expressed their understanding that radiation may give the pt additional time, but that it is not curative. I have spoken to Dr. Dusty Wilson and STEVE Bajwa about these concerns, and they will address them when they round on the pt. The family would like home hospice care after the pt completes radiation or, if no radiation, on discharge. They are interested in using [facility], and I have placed a consult order to [facility]. OLY Burkett is aware. 1500 - Received phone call from pt's son, who said that the pt and family have decided to forgo a PET scan or radiation, and instead would like to take the pt home with hospice. A text was sent to the [facility] team to make them aware, and to give them son's phone number ([phone]). Will discuss findings with members of the interdisciplinary team.      Thank you for this referral.        In addition to the E&M described above, a 30--minute ACP meeting was spent discussing the pt's wishes concerning end of life care. .    Subjective:     History obtained from:  Patient, Family, Care Provider and Chart    Chief Complaint: L-sided weakness    History of Present Illness:  From H&P: \"     Patient is an 82yoM with PMH significant for scalp melanoma, prior TIA, h/o prostate cancer (with bladder sphincter placement), CAD, HTN who presents with gait disturbance, fall, slurred speech, and weakness. Patient is accompanied by his son who helps provide history as well. Patient has been having worsening weakness for over a week. He began having some L facial droop with drooling and slurred speech as well. He reports that he feels weaker on his L side, including both upper and lower extremities, but L leg weakness is very pronounced. He is alert and interactive. He reports falling recently with some residual pain over his L lower ribs. . . . On ER evaluation, patient has grossly abnormal CT head with evidence of edema with possible evolving infarct. \"  CT Head WO contrast demonstrated increased right frontal edema with a right 3.5 x 2.4 cm area of mixed density concerning for possible metastatic disease versus evolving infarct. Likely stage IV metastatic melanoma. Speech recommends regular diet with thin liquids. Urology has deactivated the pt's artificial urinary sphincter and placed a Hernandez. Being seen by PT/OT. Per hem/onc's note, the pt \" was diagnosed with malignant melanoma over his scalp region in early 2018. He is status post surgical resection as well as neck dissection for a positive sentinel lymph node. Post resection, patient unfortunately developed recurrent skin disease around site of his prior resection felt not to be resection margin related. He was given pembrolizumab x5-6 doses (till 8/17) under care of Dr. Janis Mcallister at [facility]. Thereafter this was stopped after patient developed cutaneous toxicity as well as lip swelling. \"          Advance Directive: Yes       Code Status:  DNR            Health Care Power of : Yes - Patient states he has a [address]; family member to bring copy.     Past Medical History:   Diagnosis Date    Abnormal cardiovascular function study     Arthritis     generalized osteo    ASCVD (arteriosclerotic cardiovascular disease)     Bladder neck obstruction 1/7/2014    Bladder neck obstruction 1/7/2014    CAD (coronary artery disease) 2010    stent x 3    Coronary atherosclerosis of native coronary artery 5/10/2013    CVA (cerebral vascular accident) ([address]     Dyslipidemia     managed with medication     Dyslipidemia     H/O prostate cancer 9/30/2013    History of prostate cancer 1995    TURP with radiation     HLD (hyperlipidemia) 5/10/2013    HTN (hypertension) 5/10/2013    Hx-TIA (transient ischemic attack) 2009    no residual effects    Hypertension     controlled with med    Hypertensive heart disease 11/5/2015    Malignant neoplasm of prostate ([address] 1/7/2014    Malignant neoplasm of prostate ([address] 1/7/2014    [facility]     Nausea & vomiting     Other specified forms of chronic ischemic heart disease 2014    Other specified forms of chronic ischemic heart disease 2014    Personal history of prostate cancer     Posttraumatic compression fracture of thoracic vertebra [facility] 10/29/2015    Retention of urine, unspecified 2014    Retention of urine, unspecified 2014    Status post total knee replacement 2013    Stress incontinence, male 2014    Stress incontinence, male 2014    TIA (transient ischemic attack) 5/10/2013    Urinary incontinence       Past Surgical History:   Procedure Laterality Date    BIOPSY PROSTATE      CARDIAC SURG PROCEDURE UNLIST  2010    CARDIAC STENT     HX BLADDER SUSPENSION      male sling    HX HERNIA REPAIR Right ,     inguinal repair    HX KNEE ARTHROSCOPY Right     right    HX KNEE REPLACEMENT Right 2013    HX MALIGNANT SKIN LESION EXCISION  2016    top of head -Melanoma    HX PTCA      3 cardiac stents, mid and distal LAD, mid Cx    HX RADICAL PROSTATECTOMY      HX UROLOGICAL      MALE SLING      Family History   Problem Relation Age of Onset    Cancer Mother     Heart Disease Father 61        MI or CVA-     Stroke Father 61        aphasic    Heart Attack Father     Cancer Brother         prostate CA    Hypertension Brother     Heart Disease Brother       Social History     Tobacco Use    Smoking status: Former Smoker     Packs/day: 1.50     Years: 30.00     Pack years: 45.00     Last attempt to quit: 1985     Years since quittin.2    Smokeless tobacco: Never Used    Tobacco comment: quit     Substance Use Topics    Alcohol use: No     Prior to Admission medications    Medication Sig Start Date End Date Taking? Authorizing Provider   methotrexate (RHEUMATREX) 2.5 mg tablet 2.5 mg po Monday, Wednesday and Friday.  19   Martha Mcclellan MD   carvedilol (COREG) 6.25 mg tablet Take 1 Tab by mouth two (2) times daily (with meals). 7/1/19   Tom Mabry MD   cetirizine (ZYRTEC) 10 mg tablet Take 1 Tab by mouth daily. 7/1/19   Tom Mabry MD   simvastatin (ZOCOR) 20 mg tablet Take 1 Tab by mouth every morning. 7/1/19   Tom Mabry MD   potassium chloride SR (KLOR-CON 10) 10 mEq tablet Take 2 Tabs by mouth two (2) times a day. 5/13/19   Tom Mabry MD   hydroCHLOROthiazide (HYDRODIURIL) 25 mg tablet Take 0.5 Tabs by mouth daily. 12/28/18   Tom Mabry MD   clopidogrel (PLAVIX) 75 mg tab Take 1 Tab by mouth every morning. Last dose 9/11/15 12/28/18   Tom Mabry MD   raNITIdine (ZANTAC) 150 mg tablet Take 150 mg by mouth daily. Provider, Historical   fluticasone (CUTIVASE) 0.005 % ointment Apply  to affected area two (2) times a day. apply thin layer as directed    Provider, Historical   triamcinolone acetonide (KENALOG) 0.1 % topical cream Apply  to affected area two (2) times a day. use thin layer    Provider, Historical   oxyCODONE IR (ROXICODONE) 5 mg immediate release tablet Take 5 mg by mouth every four (4) hours as needed for Pain. Provider, Historical   aspirin 81 mg tablet Take 81 mg by mouth every morning. Provider, Historical       Allergies   Allergen Reactions    Ace Inhibitors Angioedema    Arb-Angiotensin Receptor Antagonist Angioedema        Review of Systems:  A comprehensive review of systems was negative except for: Ears, Nose, Mouth, Throat, and Face: Positive for mild discomfort on R side of neck. Objective:     Visit Vitals  /84 (BP 1 Location: Left arm, BP Patient Position: At rest)   Pulse 76   Temp 97.2 °F (36.2 °C)   Resp 18   Ht 5' 10\" (1.778 m)   Wt 164 lb 14.5 oz (74.8 kg)   SpO2 93%   BMI 23.66 kg/m²        Physical Exam:    General:  Cooperative but sleepy. No acute distress. Eyes:  Conjunctivae/corneas clear    Nose: Nares normal. Septum midline.    Neck: Supple, symmetrical, trachea midline, no JVD Lungs:   Clear to auscultation bilaterally, unlabored   Heart:  Regular rate and rhythm, no murmur    Abdomen:   Soft, non-tender, non-distended   Extremities: Normal, atraumatic, no cyanosis or edema   Skin: Skin color, texture, turgor normal. Multiple ecchymoses on arms.    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) ICD-10-CM: C79.31  ICD-9-CM: 198.3  7/21/2019 Yes        Vasogenic brain edema ([facility] ICD-10-CM: G93.6  ICD-9-CM: 348.5  7/21/2019 Yes        Weakness ICD-10-CM: R53.1  ICD-9-CM: 780.79  7/20/2019 Unknown        * (Principal) Abnormal head CT ICD-10-CM: R93.0  ICD-9-CM: 793.0  7/20/2019 Unknown        Malignant melanoma of scalp ([facility] ICD-10-CM: C43.4  ICD-9-CM: 172.4  11/9/2016 Yes        Malignant neoplasm of prostate ([facility] ICD-10-CM: C61  ICD-9-CM: 809  11/8/2016 Yes        HTN (hypertension) ICD-10-CM: I10  ICD-9-CM: 401.9  5/10/2013 Yes              Signed By: Evon Tapia NP     July 22, 2019

## 2019-07-22 NOTE — PROGRESS NOTES
Date of Outreach Update:  Hugh Patel was seen and assessed. MEWS Score: 1 (07/22/19 1125)  Vitals:    07/22/19 0402 07/22/19 0742 07/22/19 1125 07/22/19 1523   BP: 144/75 100/50 143/84 147/76   Pulse: 71 78 76 61   Resp: 18 17 18 17   Temp: 98 °F (36.7 °C) 98 °F (36.7 °C) 97.2 °F (36.2 °C) 97.5 °F (36.4 °C)   SpO2: 90% 100% 93% 94%   Weight:       Height:             Pain Assessment  Pain Intensity 1: 0 (07/21/19 2000)        Patient Stated Pain Goal: 0      Previous Outreach assessment has been reviewed. There have been no significant clinical changes since the completion of the last dated Outreach assessment. Will continue to follow up per outreach protocol.     Signed By:   Romell Spatz, RN    July 22, 2019 5:47 PM

## 2019-07-22 NOTE — HOSPICE
[facility]-    I talked with pt's son, Ailyn Wylie, who is one of 3 adult children that are the [facility] for their father. He states that he would like for his father to return home where their mother is who has dementia. I discussed that pt was on a few important meds that he is receiving IV now. He states that the family would like the pt to start out at home initially and if he needed sx management at the [facility] that would be ok. Meeting set up at 1400 per family request so that all adult children can be present to discuss hospice services.     Thank you for this referral-    Charo Segovia RN BSN  Hospice Liaison  [phone]

## 2019-07-22 NOTE — PROGRESS NOTES
Date of Outreach Update:  Cali Epstein was seen and assessed. Previous Outreach assessment has been reviewed. There have been no significant clinical changes since the completion of the last dated Outreach assessment. Patient still confused, trying to take off mitts. Family is at bedside. Discussed patient with primary RN and she has no concerns. Will continue to follow up per outreach protocol.     Signed By:   Patricia Morales    July 22, 2019 5:53 AM

## 2019-07-22 NOTE — PROGRESS NOTES
Hospitalist Progress Note    Patient: Cali Epstein MRN: [id number]  SSN: [id number]    YOB: 1935  Age: 82 y.o. Sex: male      Admit Date: 7/19/2019    LOS: 2 days     Subjective:     82year old CM with a PMH of HTN, HLD, and melanoma of the scalp admitted on 7/20 with ataxia and slurred speech and an abnormal CT head. MRI revealed 2 masses, likely metastatic melanoma. His family opted for hospice care. 7/22 - The patient is calm and confused. Denies CP/SOB. Asking to get mitts off. Review of systems negative except stated above. Objective:     Visit Vitals  /84 (BP 1 Location: Left arm, BP Patient Position: At rest)   Pulse 76   Temp 97.2 °F (36.2 °C)   Resp 18   Ht 5' 10\" (1.778 m)   Wt 74.8 kg (164 lb 14.5 oz)   SpO2 93%   BMI 23.66 kg/m²      Oxygen Therapy  O2 Sat (%): 93 % (07/22/19 1125)  Pulse via Oximetry: 76 beats per minute (07/21/19 1500)  O2 Device: Room air (07/21/19 1230)      Intake and Output:     Intake/Output Summary (Last 24 hours) at 7/22/2019 1348  Last data filed at 7/22/2019 0536  Gross per 24 hour   Intake    Output 1275 ml   Net -1275 ml         Physical Exam:   GENERAL: alert, cooperative, no distress, appears stated age  EYE: conjunctivae/corneas clear. PERRL. THROAT & NECK: normal and no erythema or exudates noted. LUNG: clear to auscultation bilaterally  HEART: regular rate and rhythm, S1S2, no murmur, no JVD  ABDOMEN: soft, non-tender, non-distended. Bowel sounds normal.   EXTREMITIES:  No edema, 2+ pedal/radial pulses bilaterally  SKIN: large right scalp melanoma  NEUROLOGIC: A&Ox2. Cranial nerves 2-12 grossly intact.     Lab/Data Review:  Recent Results (from the past 24 hour(s))   METABOLIC PANEL, BASIC    Collection Time: 07/22/19  5:01 AM   Result Value Ref Range    Sodium 146 (H) 136 - 145 mmol/L    Potassium 3.8 3.5 - 5.1 mmol/L    Chloride 110 (H) 98 - 107 mmol/L    CO2 26 21 - 32 mmol/L    Anion gap 10 7 - 16 mmol/L    Glucose 146 (H) 65 - 100 mg/dL    BUN 12 8 - 23 MG/DL    Creatinine 0.78 (L) 0.8 - 1.5 MG/DL    GFR est AA >60 >60 ml/min/1.73m2    GFR est non-AA >60 >60 ml/min/1.73m2    Calcium 8.3 8.3 - 10.4 MG/DL   CBC WITH AUTOMATED DIFF    Collection Time: 07/22/19  5:01 AM   Result Value Ref Range    WBC 13.1 (H) 4.3 - 11.1 K/uL    RBC 4.15 (L) 4.23 - 5.6 M/uL    HGB 13.1 (L) 13.6 - 17.2 g/dL    HCT 38.1 (L) 41.1 - 50.3 %    MCV 91.8 79.6 - 97.8 FL    MCH 31.6 26.1 - 32.9 PG    MCHC 34.4 31.4 - 35.0 g/dL    RDW 13.5 11.9 - 14.6 %    PLATELET 163 873 - 175 K/uL    MPV 10.0 9.4 - 12.3 FL    ABSOLUTE NRBC 0.00 0.0 - 0.2 K/uL    NEUTROPHILS 92 (H) 43 - 78 %    LYMPHOCYTES 4 (L) 13 - 44 %    MONOCYTES 4 4.0 - 12.0 %    EOSINOPHILS 0 (L) 0.5 - 7.8 %    BASOPHILS 0 0.0 - 2.0 %    IMMATURE GRANULOCYTES 0 0.0 - 5.0 %    ABS. NEUTROPHILS 12.1 (H) 1.7 - 8.2 K/UL    ABS. LYMPHOCYTES 0.5 0.5 - 4.6 K/UL    ABS. MONOCYTES 0.5 0.1 - 1.3 K/UL    ABS. EOSINOPHILS 0.0 0.0 - 0.8 K/UL    ABS. BASOPHILS 0.0 0.0 - 0.2 K/UL    ABS. IMM. GRANS. 0.0 0.0 - 0.5 K/UL    DF AUTOMATED         Imaging:  Xr Chest Pa Lat    Result Date: 7/20/2019  EXAM: Chest x-ray. INDICATION: Pain after falling. COMPARISON: Prior chest x-ray on March 18, 2016. TECHNIQUE: Two view chest. FINDINGS: The lungs are clear. The cardiac size, mediastinal contour and pulmonary vasculature are normal. No pneumothorax or pleural effusion is seen. There are degenerative changes in the spine with an old vertebroplasty at the thoracolumbar junction. No acute displaced fracture is identified. IMPRESSION: No acute process. Mri Brain W Wo Cont    Result Date: 7/20/2019  BRAIN MRI BEFORE AND AFTER CONTRAST: CLINICAL HISTORY:  Follow-up abnormal CT an 82year-old with worsening left facial droop and left-sided weakness. There is a history of malignant melanoma.  TECHNIQUE:  Sagittal T1 weighted, coronal FLAIR, and axial T1 and T2-weighted spin echo, FLAIR, gradient echo, and diffusion-weighted images were obtained. Axial and coronal T1-weighted images were then performed after injection of 16 cc of Dotarem IV. COMPARISON:  CT earlier today. FINDINGS: There is a 3.5 cm heterogeneously enhancing mass at the lateral margin of the right basal ganglia as well as a 2.1 cm right subfrontal enhancing mass. T1-weighted images show small areas of associated internal hemorrhage. There is significant adjacent vasogenic edema. Only mild mass effect and midline shift. No evidence of acute geographic infarction. The ventricles are normal in size and configuration accounting for the patient's age. Orbits and paranasal sinuses as well as mastoid air cells are clear as imaged. IMPRESSION: RIGHT LATERAL BASAL GANGLIA AND SUBFRONTAL MASSES WITH SIGNIFICANT ADJACENT VASOGENIC EDEMA HER MOST CONSISTENT WITH METASTATIC MALIGNANT MELANOMA IN THIS CLINICAL SETTING. This case was reviewed in consultation with colleagues. Ct Head Wo Cont    Result Date: 7/20/2019  EXAM: Noncontrast CT head. INDICATION: Left-sided weakness. COMPARISON: Prior CT head on March 18, 2016. TECHNIQUE: Axial noncontrast CT images of the head were obtained. Radiation dose reduction techniques were used for this study. Our CT scanners use one or all of the following:  Automated exposure control, adjustment of the mA and/or kV according to patient size, iterative reconstruction. FINDINGS: There is no edema in the right frontal and anterior right temporal lobe white matter, with a 3.5 x 2.4 cm focus of mixed attenuation in the right basal ganglia. Mild mass effect is seen on the right lateral ventricle, without midline shift or definite acute hemorrhage. 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 The posterior fossa structures are grossly normal. The skull and skull base are intact. The visualized paranasal sinuses and mastoid air cells are clear.      IMPRESSION: Abnormal low attenuation in the right frontal and temporal lobe white matter, with mixed attenuation in the right basal ganglia. These changes could relate to neoplasm or infection, although an evolving infarct is not excluded. A pre and postcontrast MRI brain would be beneficial for further evaluation. Findings discussed with Dr. Rickey Pena. Results for orders placed or performed during the hospital encounter of 19   2D ECHO COMPLETE ADULT (TTE) W OR [address] [facility] [address] [facility], [address] [facility]  [phone]    Transthoracic Echocardiogram  2D, M-mode, Doppler, and Color Doppler    Patient: Geeta Cantu  MR #: [id number]  : 1935  Age: 82 years  Gender: Male  Study date: 2019  Account #: [de-identified]  Height: 70 in  Weight: 171.6 lb  BSA: 1.96 mï¾²  Status:Routine  Location: [facility]  BP: 135/ 74    Allergies: ACE INHIBITORS, ARB-ANGIOTENSIN RECEPTOR ANTAGONIST    Sonographer:  Jaqueline Juarez RDCS  Group:  [facility]  Referring Physician:  Margi Dolan MD  Reading Physician:  Raymond Mishra. Lula Zarco MD [facility]    INDICATIONS: CVA    *Limited parasternal views obtained from the subcostal window due to poor  visualization of the parasternal window. Limited 2-d measurements obtained   due  to limited visualization. PROCEDURE: This was a routine study. A transthoracic echocardiogram was  performed. The study included complete 2D imaging, M-mode, complete spectral  Doppler, and color Doppler. Intravenous contrast (agitated saline, 9 ml) was  administered to evaluate possible R-L intracardiac shunting. This was a  technically difficult study. LEFT VENTRICLE: Size was normal. Systolic function was normal. Ejection  fraction was estimated in the range of 60 % to 65 %. There were no regional  wall motion abnormalities. Wall thickness was normal. Left ventricular  diastolic function parameters were normal. E/e' avg: 10.70.     RIGHT VENTRICLE: The size was normal. Systolic function was normal.    LEFT ATRIUM: Size was normal.    ATRIAL SEPTUM: Contrast injection was performed. There was no right-to-left  shunt, with provocative maneuvers to increase right atrial pressure. RIGHT ATRIUM: Size was normal.    SYSTEMIC VEINS: IVC: The inferior vena cava was not well visualized. AORTIC VALVE: The valve was structurally normal, tri-commissural. There was   no  evidence for stenosis. There was no insufficiency. MITRAL VALVE: Valve structure was normal. There was no evidence for stenosis. There was no regurgitation. TRICUSPID VALVE: The valve structure was normal. There was no evidence for  stenosis. There was no regurgitation. PULMONIC VALVE: The valve structure was normal. There was no evidence for  stenosis. There was no insufficiency. PERICARDIUM: There was no pericardial effusion. AORTA: The root exhibited normal size. SUMMARY:    -  Left ventricle: Systolic function was normal. Ejection fraction was  estimated in the range of 60 % to 65 %. There were no regional wall motion  abnormalities. -  Atrial septum: Contrast injection was performed. There was no   right-to-left  shunt, with provocative maneuvers to increase right atrial pressure. SYSTEM MEASUREMENT TABLES    2D  Ao Diam: 3.5 cm  LAEDV Index (A-L): 19.6 ml/m2  LVOT Diam: 2 cm    Prepared and signed by    June Vale. 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 ([address] (7/21/2019)  - Continue Keppra  - Continue Decadron  - Continue Pepcid  - To discharge with hospice    Active Problems:    Abnormal head CT (7/20/2019)  - With brain masses, likely melanoma mets      Vasogenic brain edema ([address] (7/21/2019)  - Continue Decadron for now  - WBC up due to steroids      Delirium (7/22/2019)  - Likely due to #1  - Ativan and Haldol PRN      HTN (hypertension) (5/10/2013)  - Stable  - Holding Coreg      Malignant melanoma of scalp ([facility] (11/9/2016)  - No with mets to the brain  - To follow up with Oncology as outpatient if family opts      Weakness (7/20/2019)      [facility] (11/8/2016)    Dispo - To discharge home with [facility]    DIET REGULAR    DVT Prophylaxis: SCDs      Signed By: Kaycee Farah DO     July 22, 2019

## 2019-07-22 NOTE — PROGRESS NOTES
Problem: Falls - Risk of  Goal: *Absence of Falls  Description  Document Bolivar Boeck Fall Risk and appropriate interventions in the flowsheet. Outcome: Progressing Towards Goal  Note:   Fall Risk Interventions:  Mobility Interventions: Bed/chair exit alarm, Patient to call before getting OOB    Mentation Interventions: Adequate sleep, hydration, pain control, Bed/chair exit alarm, Door open when patient unattended, Reorient patient, Increase mobility    Medication Interventions: Bed/chair exit alarm, Patient to call before getting OOB, Teach patient to arise slowly    Elimination Interventions: Bed/chair exit alarm, Call light in reach, Patient to call for help with toileting needs, Toileting schedule/hourly rounds              Problem: Patient Education: Go to Patient Education Activity  Goal: Patient/Family Education  Outcome: Progressing Towards Goal     Problem: Dysphagia (Adult)  Goal: Interventions  Outcome: Progressing Towards Goal     Problem: Pressure Injury - Risk of  Goal: *Prevention of pressure injury  Description  Document Selvin Scale and appropriate interventions in the flowsheet.   Outcome: Progressing Towards Goal  Note:   Pressure Injury Interventions:  Sensory Interventions: Assess changes in LOC, Keep linens dry and wrinkle-free, Minimize linen layers, Pressure redistribution bed/mattress (bed type)    Moisture Interventions: Absorbent underpads, Check for incontinence Q2 hours and as needed, Limit adult briefs, Minimize layers    Activity Interventions: Increase time out of bed, Pressure redistribution bed/mattress(bed type)    Mobility Interventions: HOB 30 degrees or less, Pressure redistribution bed/mattress (bed type)    Nutrition Interventions: Offer support with meals,snacks and hydration, Document food/fluid/supplement intake    Friction and Shear Interventions: Lift sheet, Minimize layers, Foam dressings/transparent film/skin sealants, HOB 30 degrees or less                Problem: Patient Education: Go to Patient Education Activity  Goal: Patient/Family Education  Outcome: Progressing Towards Goal     Problem: Delirium Treatment  Goal: *Level of consciousness restored to baseline  Outcome: Progressing Towards Goal  Goal: *Level of environmental perceptions restored to baseline  Outcome: Progressing Towards Goal  Goal: *Sensory perception restored to baseline  Outcome: Progressing Towards Goal  Goal: *Emotional stability restored to baseline  Outcome: Progressing Towards Goal  Goal: *Functional assessment restored to baseline  Outcome: Progressing Towards Goal  Goal: *Absence of falls  Outcome: Progressing Towards Goal  Goal: *Will remain free of delirium, CAM Score negative  Outcome: Progressing Towards Goal  Goal: *Cognitive status will be restored to baseline  Outcome: Progressing Towards Goal  Goal: Interventions  Outcome: Progressing Towards Goal     Problem: Patient Education: Go to Patient Education Activity  Goal: Patient/Family Education  Outcome: Progressing Towards Goal     Problem: Patient Education: Go to Patient Education Activity  Goal: Patient/Family Education  Outcome: Progressing Towards Goal

## 2019-07-22 NOTE — INTERDISCIPLINARY ROUNDS
Interdisciplinary team rounds were held 7/22/2019 with the following team members:Care Management, Nursing, Physical Therapy and Physician. Plan of care discussed. See clinical pathway and/or care plan for interventions and desired outcomes.

## 2019-07-22 NOTE — PROGRESS NOTES
[facility] [address] CRITICAL CARE OUTREACH NURSE PROGRESS REPORT      SUBJECTIVE: Assessed patient secondary to outreach protocol. MEWS Score: 1 (07/22/19 0402)  Vitals:    07/22/19 0001 07/22/19 0402 07/22/19 0742 07/22/19 1125   BP: 153/90 144/75 100/50 143/84   Pulse: 74 71 78 76   Resp: 20 18 17 18   Temp: 97.4 °F (36.3 °C) 98 °F (36.7 °C) 98 °F (36.7 °C) 97.2 °F (36.2 °C)   SpO2: 98% 90% 100% 93%   Weight:       Height:              LAB DATA:    Recent Labs     07/22/19  0501 07/21/19  0328 07/20/19 0348 07/19/19  2259   * 144 143 141   K 3.8 3.5 3.0* 3.7   * 107 107 103   CO2 26 26 26 27   AGAP 10 11 10 11   * 148* 113* 156*   BUN 12 8 9 11   CREA 0.78* 0.75* 0.88 1.44   GFRAA >60 >60 >60 >60   GFRNA >60 >60 >60 50*   CA 8.3 8.7 8.5 9.4   MG  --   --   --  2.0   PHOS  --   --   --  2.3   ALB  --   --   --  4.0   TP  --   --   --  7.1   GLOB  --   --   --  3.1   AGRAT  --   --   --  1.3   ALT  --   --   --  33        Recent Labs     07/22/19  0501 07/21/19 0328 07/20/19 0348   WBC 13.1* 12.6* 7.2   HGB 13.1* 13.9 12.5*   HCT 38.1* 41.8 36.9*    194 203          OBJECTIVE: On arrival to room, I found patient to be resting in bed. Pain Assessment  Pain Intensity 1: 0 (07/21/19 2000)        Patient Stated Pain Goal: 0                                 ASSESSMENT:  Pt in bed, drowsy, wakes to voice, no family at bedside. Pt on RA, stable sat, Pt in NAD, no immediate issues at this time. PLAN:        Previous Outreach assessment has been reviewed. There have been no significant clinical changes since the completion of the last dated Outreach assessment. Will continue to follow up per outreach protocol.     Signed By:   Kesha Barry RN    July 22, 2019 10:48 PM

## 2019-07-22 NOTE — PROGRESS NOTES
JACQUI discussed case with palliative care NP, Jena Shaw. Patient's family has decided their preference for discharge planning is to take patient to home with hospice care per NP. Daughter lives in the home with patient and provides care to him and patient's spouse who has advanced dementia. JACQUI contacted liaisons from [address] [place], [place] [address]  to request contact be made with patient's daughter and/or son. Awaiting hospice consult at this time. Care Management Interventions  PCP Verified by CM:  Yes  Transition of Care Consult (CM Consult): Home Hospice  Discharge Durable Medical Equipment: No  Physical Therapy Consult: Yes  Occupational Therapy Consult: Yes  Speech Therapy Consult: No  Current Support Network: Lives with Caregiver, Lives with Spouse(Lives with wife and daughter)  Confirm Follow Up Transport: Family  Plan discussed with Pt/Family/Caregiver: No(Discussed case with palliative care NP. waiting for hospice liaison to meet with patient.)  Discharge Location  Discharge Placement: Home with hospice

## 2019-07-23 NOTE — PROGRESS NOTES
07/23/19 1854   NIH Stroke Scale   Interval Other (comment)  (shift report with Kentrell Mauricio RN)   LOC 0   LOC Questions 0   LOC Commands 0   Best Gaze 0   Visual 0   Facial Palsy 1   Motor Right Arm 0   Motor Left Arm 0   Motor Right Leg 0   Motor Left Leg 1   Limb Ataxia 0   Sensory 1   Best Language 0   Dysarthria 0   Extinction and Inattention 0   Total 3

## 2019-07-23 NOTE — PROGRESS NOTES
[facility] CRITICAL CARE OUTREACH NURSE PROGRESS REPORT      SUBJECTIVE: Called to assess patient secondary to outreach protocol. MEWS Score: 1 (07/22/19 2004)  Vitals:    07/22/19 1125 07/22/19 1523 07/22/19 2004 07/22/19 2353   BP: 143/84 147/76 152/78 155/82   Pulse: 76 61 69 92   Resp: 18 17 18 18   Temp: 97.2 °F (36.2 °C) 97.5 °F (36.4 °C) 97.5 °F (36.4 °C) 97.5 °F (36.4 °C)   SpO2: 93% 94% 92% 92%   Weight:       Height:            LAB DATA:    Recent Labs     07/22/19  0501 07/21/19  0328 07/20/19  0348   * 144 143   K 3.8 3.5 3.0*   * 107 107   CO2 26 26 26   AGAP 10 11 10   * 148* 113*   BUN 12 8 9   CREA 0.78* 0.75* 0.88   GFRAA >60 >60 >60   GFRNA >60 >60 >60   CA 8.3 8.7 8.5        Recent Labs     07/22/19  0501 07/21/19  0328 07/20/19  0348   WBC 13.1* 12.6* 7.2   HGB 13.1* 13.9 12.5*   HCT 38.1* 41.8 36.9*    194 203          OBJECTIVE: On arrival to room, I found patient to be resting in bed with family at bedside. Pain Assessment  Pain Intensity 1: 0 (07/22/19 2000)        Patient Stated Pain Goal: 0                                 ASSESSMENT:  Pt is sleeping in bed. Pt is oriented to person. Pt has mitts and lap belt in place. Pt is calm at this time. VSS. Pt in do distress at this time. Family's questions answered. Family is appreciative of care. PLAN:  Will continue to monitor per protocol.

## 2019-07-23 NOTE — PROGRESS NOTES
Noted plans for pt to go home with Hospice. We will sign off; please do not hesitate to call with any questions.       Quang Love NP  [facility]

## 2019-07-23 NOTE — PROGRESS NOTES
[facility] CRITICAL CARE OUTREACH NURSE PROGRESS REPORT      SUBJECTIVE: Called to assess patient secondary to transfer out of CCU. MEWS Score: 1 (07/23/19 1504)  Vitals:    07/23/19 0428 07/23/19 0734 07/23/19 1145 07/23/19 1504   BP: 156/86 163/53 164/82 139/81   Pulse: 63 65 (!) 53 61   Resp: 18 18 17 18   Temp: 97.5 °F (36.4 °C) 97.7 °F (36.5 °C) 97.3 °F (36.3 °C) 98.6 °F (37 °C)   SpO2: 90% 96% 91% 94%   Weight:       Height:             LAB DATA:    Recent Labs     07/23/19  1342 07/22/19  0501 07/21/19  0328    146* 144   K 3.3* 3.8 3.5   * 110* 107   CO2 24 26 26   AGAP 10 10 11   * 146* 148*   BUN 11 12 8   CREA 0.85 0.78* 0.75*   GFRAA >60 >60 >60   GFRNA >60 >60 >60   CA 8.3 8.3 8.7        Recent Labs     07/23/19  1342 07/22/19  0501 07/21/19  0328   WBC 11.0 13.1* 12.6*   HGB 14.3 13.1* 13.9   HCT 41.1 38.1* 41.8    220 194        Patient in bed, eating lunch, with daughter and wife at bedside. Patient and family have no concerns or questions. VSS on room air. Will continue to round per outreach protocol.

## 2019-07-23 NOTE — HOSPICE
[facility]-    I met with pt and his wife briefly and then his 3 adult children/POA's in private and discussed hospice services including philosophy of hospice, care team members, care at home and at the [facility] prn. Family want to honor pt's wish to return to his home. Pt approved for routine hospice care by Dr. Lukas Groves. DNR and hospice consents signed with adan Peña/LUANNE. The plan-  1. Bed and OTB table to be delivered today  2. Transport home at [facility]. 3.  [facility] RN to admit at 1600 7/24. 4.  Per Dr. Lukas Groves request please send home rx for oxycodone po prn pain.     Thank you for this referral-    Ritesh Ortiz RN BSN  Hospice Liaison  [phone]

## 2019-07-23 NOTE — PROGRESS NOTES
SW informed by [facility] liaison, Rossana Polk, that patient is planning to DC to home via ambulance transport at 1300 tomorrow. Home hospice services will open patient's case after arrival at his home. DME delivery to be coordinated by [facility] liaison. JACQUI scheduled transport to home via [facility] for 7-24-19 at 1300.

## 2019-07-23 NOTE — INTERDISCIPLINARY ROUNDS
Interdisciplinary team rounds were held 7/23/2019 with the following team members:Care Management, Nursing and Physical Therapy. Plan of care discussed. See clinical pathway and/or care plan for interventions and desired outcomes. Plan to discharge home with hospice, possibly tomorrow.

## 2019-07-23 NOTE — PROGRESS NOTES
PT note:  Treatment deferred at this time as the family to have a meeting today with hospice. Talked with the RN about it and will defer treatment until an outcome of the meeting is determined. Will continue PT efforts.   Jadyn Pritchard, PTA

## 2019-07-23 NOTE — PROGRESS NOTES
Problem: Falls - Risk of  Goal: *Absence of Falls  Description  Document Karin Rush Fall Risk and appropriate interventions in the flowsheet. Outcome: Progressing Towards Goal  Note:   Fall Risk Interventions:  Mobility Interventions: Bed/chair exit alarm, Patient to call before getting OOB    Mentation Interventions: Adequate sleep, hydration, pain control, Bed/chair exit alarm, Door open when patient unattended, Reorient patient    Medication Interventions: Bed/chair exit alarm, Patient to call before getting OOB, Teach patient to arise slowly    Elimination Interventions: Bed/chair exit alarm, Call light in reach, Patient to call for help with toileting needs              Problem: Patient Education: Go to Patient Education Activity  Goal: Patient/Family Education  Outcome: Progressing Towards Goal     Problem: Dysphagia (Adult)  Goal: Interventions  Outcome: Progressing Towards Goal     Problem: Patient Education: Go to Patient Education Activity  Goal: Patient/Family Education  Outcome: Progressing Towards Goal     Problem: Pressure Injury - Risk of  Goal: *Prevention of pressure injury  Description  Document Selvin Scale and appropriate interventions in the flowsheet.   Outcome: Progressing Towards Goal  Note:   Pressure Injury Interventions:  Sensory Interventions: Assess changes in LOC, Keep linens dry and wrinkle-free, Minimize linen layers, Pressure redistribution bed/mattress (bed type)    Moisture Interventions: Absorbent underpads, Limit adult briefs, Minimize layers    Activity Interventions: Increase time out of bed, Pressure redistribution bed/mattress(bed type)    Mobility Interventions: HOB 30 degrees or less, Pressure redistribution bed/mattress (bed type)    Nutrition Interventions: Document food/fluid/supplement intake, Offer support with meals,snacks and hydration    Friction and Shear Interventions: Foam dressings/transparent film/skin sealants, Minimize layers, Lift sheet                Problem: Patient Education: Go to Patient Education Activity  Goal: Patient/Family Education  Outcome: Progressing Towards Goal     Problem: Delirium Treatment  Goal: *Level of consciousness restored to baseline  Outcome: Progressing Towards Goal  Goal: *Level of environmental perceptions restored to baseline  Outcome: Progressing Towards Goal  Goal: *Sensory perception restored to baseline  Outcome: Progressing Towards Goal  Goal: *Emotional stability restored to baseline  Outcome: Progressing Towards Goal  Goal: *Functional assessment restored to baseline  Outcome: Progressing Towards Goal  Goal: *Absence of falls  Outcome: Progressing Towards Goal  Goal: *Will remain free of delirium, CAM Score negative  Outcome: Progressing Towards Goal  Goal: *Cognitive status will be restored to baseline  Outcome: Progressing Towards Goal  Goal: Interventions  Outcome: Progressing Towards Goal     Problem: Patient Education: Go to Patient Education Activity  Goal: Patient/Family Education  Outcome: Progressing Towards Goal

## 2019-07-23 NOTE — PROGRESS NOTES
Hospitalist Note     Admit Date:  2019 10:47 PM   Name:  Pedrito Dubose   Age:  82 y.o.  :  1935   MRN:  [id number]   PCP:  Samantha Ferguson MD  Treatment Team: Attending Provider: Thais Felix MD; Utilization Review: Rachel Soriano RN; Consulting Provider: Lilly Pink NP; Care Manager: Aida Nissen Charge Nurse: Theo Mcnair; Physical Therapy Assistant: Marko Foster PTA    HPI/Subjective:       Mr. Rajeev Rothman is an 83 yo male with PMH of melanoma of the scalp, HTN, HLP, CAD, prostate cancer admitted with ataxia, dysarthria, and acute metabolic encephalopathy and was found to have CNS lesions likely metastatic melanoma. CT head showed right frontal and temporal lobe lesions and MRI brain showed right lateral basal ganglia and subfrontal masses with significant vasogenic edema. He has been managed with decadron/keppra. He has been seen by neurosurgery, pulmonary, urology, oncology and palliative care. Family has opted for home with hospice.       19 family present, patient resting, no issue with oral intake, has intermittent confusion, has mitts on hands, pulls at prakash cath      Objective:     Patient Vitals for the past 24 hrs:   Temp Pulse Resp BP SpO2   19 0734 97.7 °F (36.5 °C) 65 18 163/53 96 %   19 0428 97.5 °F (36.4 °C) 63 18 156/86 90 %   19 2353 97.5 °F (36.4 °C) 92 18 155/82 92 %   19 2004 97.5 °F (36.4 °C) 69 18 152/78 92 %   19 1523 97.5 °F (36.4 °C) 61 17 147/76 94 %     Oxygen Therapy  O2 Sat (%): 96 % (19 0734)  Pulse via Oximetry: 76 beats per minute (19 1500)  O2 Device: Room air (19 1230)      Intake/Output Summary (Last 24 hours) at 2019 1142  Last data filed at 2019 1049  Gross per 24 hour   Intake    Output 1800 ml   Net -1800 ml       *Note that automatically entered I/Os may not be accurate; dependent on patient compliance with collection and accurate  by techs.    General:    Elderly, sleeping  CV:   RRR. No murmur, rub, or gallop. No edema  Lungs:   CTAB. No wheezing, rhonchi, or rales. Anterior exam  Abdomen:   Soft, nontender, nondistended. Extremities: Warm and dry  Skin:     No rashes or jaundice. Neuro:  sleeping    Data Review:  I have reviewed all labs, meds, and studies from the last 24 hours:    Recent Results (from the past 24 hour(s))   PLEASE READ & DOCUMENT PPD TEST IN 48 HRS    Collection Time: 19 11:55 AM   Result Value Ref Range    PPD      mm Induration          All Micro Results     None          Results for orders placed or performed during the hospital encounter of 19   2D ECHO COMPLETE ADULT (TTE) [address]  [address] [facility], [address] [facility]  [phone]    Transthoracic Echocardiogram  2D, M-mode, Doppler, and Color Doppler    Patient: Tiera Navarro  MR #: [id number]  : 1935  Age: 82 years  Gender: Male  Study date: 2019  Account #: [de-identified]  Height: 70 in  Weight: 171.6 lb  BSA: 1.96 mï¾²  Status:Routine  Location: [facility]  BP: 135/ 74    Allergies: ACE INHIBITORS, ARB-ANGIOTENSIN RECEPTOR ANTAGONIST    Sonographer:  Aki Warren [facility]  Group:  [facility]  Referring Physician:  Charlene Mancuso MD  Reading Physician:  Holly Cardona. Maynor Reid MD [facility]    INDICATIONS: CVA    *Limited parasternal views obtained from the subcostal window due to poor  visualization of the parasternal window. Limited 2-d measurements obtained   due  to limited visualization. PROCEDURE: This was a routine study. A transthoracic echocardiogram was  performed. The study included complete 2D imaging, M-mode, complete spectral  Doppler, and color Doppler. Intravenous contrast (agitated saline, 9 ml) was  administered to evaluate possible R-L intracardiac shunting. This was a  technically difficult study.     LEFT VENTRICLE: Size was normal. Systolic function was normal. Ejection  fraction was estimated in the range of 60 % to 65 %. There were no regional  wall motion abnormalities. Wall thickness was normal. Left ventricular  diastolic function parameters were normal. E/e' avg: 10.70. RIGHT VENTRICLE: The size was normal. Systolic function was normal.    LEFT ATRIUM: Size was normal.    ATRIAL SEPTUM: Contrast injection was performed. There was no right-to-left  shunt, with provocative maneuvers to increase right atrial pressure. RIGHT ATRIUM: Size was normal.    SYSTEMIC VEINS: IVC: The inferior vena cava was not well visualized. AORTIC VALVE: The valve was structurally normal, tri-commissural. There was   no  evidence for stenosis. There was no insufficiency. MITRAL VALVE: Valve structure was normal. There was no evidence for stenosis. There was no regurgitation. TRICUSPID VALVE: The valve structure was normal. There was no evidence for  stenosis. There was no regurgitation. PULMONIC VALVE: The valve structure was normal. There was no evidence for  stenosis. There was no insufficiency. PERICARDIUM: There was no pericardial effusion. AORTA: The root exhibited normal size. SUMMARY:    -  Left ventricle: Systolic function was normal. Ejection fraction was  estimated in the range of 60 % to 65 %. There were no regional wall motion  abnormalities. -  Atrial septum: Contrast injection was performed. There was no   right-to-left  shunt, with provocative maneuvers to increase right atrial pressure. SYSTEM MEASUREMENT TABLES    2D  Ao Diam: 3.5 cm  LAEDV Index (A-L): 19.6 ml/m2  LVOT Diam: 2 cm    Prepared and signed by    Guerline Nevarez.  Ewa Antunez MD [facility]  Signed 20-Jul-2019 16:44:28         Current Meds:  Current Facility-Administered Medications   Medication Dose Route Frequency    alcohol 62% (NOZIN) nasal  1 Ampule  1 Ampule Topical Q12H    metoprolol (LOPRESSOR) injection 2.5 mg  2.5 mg IntraVENous Q4H PRN    famotidine (PF) (PEPCID) 20 mg in sodium chloride 0.9% 10 mL injection  20 mg IntraVENous Q12H    oxyCODONE IR (ROXICODONE) tablet 5 mg  5 mg Oral Q4H PRN    simvastatin (ZOCOR) tablet 20 mg  20 mg Oral 7am    sodium chloride (NS) flush 5-40 mL  5-40 mL IntraVENous Q8H    sodium chloride (NS) flush 5-40 mL  5-40 mL IntraVENous PRN    acetaminophen (TYLENOL) tablet 650 mg  650 mg Oral Q4H PRN    ondansetron (ZOFRAN) injection 4 mg  4 mg IntraVENous Q4H PRN    dexamethasone (DECADRON) 4 mg/mL injection 4 mg  4 mg IntraVENous Q6H    0.9% sodium chloride infusion  75 mL/hr IntraVENous CONTINUOUS    LORazepam (ATIVAN) injection 1 mg  1 mg IntraVENous Q2H PRN    levETIRAcetam (KEPPRA) 500 mg in 0.9% sodium chloride 100 mL IVPB  500 mg IntraVENous Q12H    haloperidol lactate (HALDOL) injection 5 mg  5 mg IntraVENous Q4H PRN       Other Studies (last 24 hours):  No results found.     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) ICD-10-CM: C79.31  ICD-9-CM: 198.3  7/21/2019 - Present Yes        Vasogenic brain edema ([facility] ICD-10-CM: G93.6  ICD-9-CM: 348.5  7/21/2019 - Present Yes        Weakness ICD-10-CM: R53.1  ICD-9-CM: 780.79  7/20/2019 - Present Yes        Abnormal head CT ICD-10-CM: R93.0  ICD-9-CM: 793.0  7/20/2019 - Present Yes        Malignant melanoma of scalp ([facility] ICD-10-CM: C43.4  ICD-9-CM: 172.4  11/9/2016 - Present Yes        Malignant neoplasm of prostate ([facility] ICD-10-CM: C61  ICD-9-CM: 185  11/8/2016 - Present Yes        HTN (hypertension) ICD-10-CM: I10  ICD-9-CM: 401.9  5/10/2013 - Present Yes              Plan:    · Metastatic melanoma: appreciate consultants, family pending hospice meeting,  Will change keppra and dexamethasone to oral meds    DC planning/Dispo:  Pending       Diet:  DIET REGULAR  DVT ppx:  SCD    Signed:  Khoa Aldrich MD

## 2019-07-23 NOTE — PROGRESS NOTES
Palliative Care Progress Note    Patient: Margi Galvez MRN: [id number]  SSN: [id number]    YOB: 1935  Age: 82 y.o. 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 Discussed case with appropriate providers. Reviewed lab and X-ray data from last 24 hours. Reviewed and summarized notes from admission to present. Discussed case with appropriate providers: ANIA Palafox  Reviewed laboratory and x-ray data from admission to present. Pt resting in bed, somnolent, brother is at bedside. Pt endorses occasional discomfort in R side of neck; reminded him that oxycodone is available. Pt denies SOB, N/V. The family will meet with [facility] at 1400 today to arrange for home hospice. Will discuss findings with members of the interdisciplinary team.      Thank you for this referral.          More than 50% of this 35 minute visit was spent counseling and coordination of care as outlined above. Subjective:     Review of Systems:  A comprehensive review of systems was negative except for: Ears, Nose, Mouth, Throat, and Face: Positive  for mild discomfort on R side of neck. .     Objective:     Visit Vitals  /53 (BP 1 Location: Left arm, BP Patient Position: At rest)   Pulse 65   Temp 97.7 °F (36.5 °C)   Resp 18   Ht 5' 10\" (1.778 m)   Wt 164 lb 14.5 oz (74.8 kg)   SpO2 96%   BMI 23.66 kg/m²       Physical Exam:    General:  Cooperative. No acute distress. Eyes:  Conjunctivae/corneas clear    Nose: Nares normal. Septum midline.    Neck: Supple, symmetrical, trachea midline, no JVD   Lungs:   Clear to auscultation bilaterally, unlabored   Heart:  Regular rate and rhythm, no murmur    Abdomen:   Soft, non-tender, non-distended   Extremities: Normal, atraumatic, no cyanosis or edema, ecchymoses on arms   Skin: Skin color, texture, turgor normal. No rash or lesions.    Neurologic: Nonfocal   Psych: Somnolent     Signed By: Zaria Oliva NP     July 23, 2019

## 2019-07-24 NOTE — DISCHARGE SUMMARY
Hospitalist Discharge Summary     Admit Date:  2019 10:47 PM   Name:  Mikie Chand   Age:  82 y.o.  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 ICD-10-CM: G93.6  ICD-9-CM: 348.5  2019 - Present Yes        Weakness ICD-10-CM: R53.1  ICD-9-CM: 780.79  2019 - Present Yes        Abnormal head CT ICD-10-CM: R93.0  ICD-9-CM: 793.0  2019 - Present Yes        Malignant melanoma of scalp ([facility] ICD-10-CM: C43.4  ICD-9-CM: 172.4  2016 - Present Yes        Malignant neoplasm of prostate ([facility] ICD-10-CM: C61  ICD-9-CM: 185  2016 - Present Yes        HTN (hypertension) ICD-10-CM: I10  ICD-9-CM: 401.9  5/10/2013 - Present Yes                    Hospital Course:      Mr. Cholo Hanley is an 83 yo male with PMH of melanoma of the scalp, HTN, HLP, CAD, prostate cancer admitted with ataxia, dysarthria, and acute metabolic encephalopathy and was found to have CNS lesions likely metastatic melanoma. CT head showed right frontal and temporal lobe lesions and MRI brain showed right lateral basal ganglia and subfrontal masses with significant vasogenic edema. He has been managed with decadron/keppra. He has been seen by neurosurgery, pulmonary, urology, oncology and palliative care. Family has opted for home with hospice. He will discharge home today with home hospice. Disposition: Home or Self Care  Activity: Activity as tolerated  Diet: DIET REGULAR    Follow up instructions, discharge meds at bottom of this note. Plan was discussed with daughter. All questions answered. Patient was stable at time of discharge. Patient will call a physician or return if any concerns. Diagnostic Imaging/Tests:   Xr Chest Pa Lat    Result Date: 7/20/2019  EXAM: Chest x-ray. INDICATION: Pain after falling. COMPARISON: Prior chest x-ray on March 18, 2016. TECHNIQUE: Two view chest. FINDINGS: The lungs are clear. The cardiac size, mediastinal contour and pulmonary vasculature are normal. No pneumothorax or pleural effusion is seen. There are degenerative changes in the spine with an old vertebroplasty at the thoracolumbar junction. No acute displaced fracture is identified. IMPRESSION: No acute process. Mri Brain W Wo Cont    Result Date: 7/20/2019  BRAIN MRI BEFORE AND AFTER CONTRAST: CLINICAL HISTORY:  Follow-up abnormal CT an 82year-old with worsening left facial droop and left-sided weakness. There is a history of malignant melanoma. TECHNIQUE:  Sagittal T1 weighted, coronal FLAIR, and axial T1 and T2-weighted spin echo, FLAIR, gradient echo, and diffusion-weighted images were obtained. Axial and coronal T1-weighted images were then performed after injection of 16 cc of Dotarem IV. COMPARISON:  CT earlier today. FINDINGS: There is a 3.5 cm heterogeneously enhancing mass at the lateral margin of the right basal ganglia as well as a 2.1 cm right subfrontal enhancing mass. T1-weighted images show small areas of associated internal hemorrhage. There is significant adjacent vasogenic edema. Only mild mass effect and midline shift. No evidence of acute geographic infarction. The ventricles are normal in size and configuration accounting for the patient's age.   Orbits and paranasal sinuses as well as mastoid air cells are clear as imaged. IMPRESSION: RIGHT LATERAL BASAL GANGLIA AND SUBFRONTAL MASSES WITH SIGNIFICANT ADJACENT VASOGENIC EDEMA HER MOST CONSISTENT WITH METASTATIC MALIGNANT MELANOMA IN THIS CLINICAL SETTING. This case was reviewed in consultation with colleagues. Ct Head Wo Cont    Result Date: 7/20/2019  EXAM: Noncontrast CT head. INDICATION: Left-sided weakness. COMPARISON: Prior CT head on March 18, 2016. TECHNIQUE: Axial noncontrast CT images of the head were obtained. Radiation dose reduction techniques were used for this study. Our CT scanners use one or all of the following:  Automated exposure control, adjustment of the mA and/or kV according to patient size, iterative reconstruction. FINDINGS: There is no edema in the right frontal and anterior right temporal lobe white matter, with a 3.5 x 2.4 cm focus of mixed attenuation in the right basal ganglia. Mild mass effect is seen on the right lateral ventricle, without midline shift or definite acute hemorrhage. 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 The posterior fossa structures are grossly normal. The skull and skull base are intact. The visualized paranasal sinuses and mastoid air cells are clear. IMPRESSION: Abnormal low attenuation in the right frontal and temporal lobe white matter, with mixed attenuation in the right basal ganglia. These changes could relate to neoplasm or infection, although an evolving infarct is not excluded. A pre and postcontrast MRI brain would be beneficial for further evaluation. Findings discussed with Dr. Jaquan Gill.        Echocardiogram results:  Results for orders placed or performed during the hospital encounter of 07/19/19   2D ECHO COMPLETE ADULT (TTE) W OR 1400 [facility] [address] [facility], [facility] [address] [facility]  [phone]    Transthoracic Echocardiogram  2D, M-mode, Doppler, and Color Doppler    Patient: Ness Toscano  MR #: [id number]  : 1935  Age: 82 years  Gender: Male  Study date: 2019  Account #: [de-identified]  Height: 70 in  Weight: 171.6 lb  BSA: 1.96 mï¾²  Status:Routine  Location: [facility]  BP: 135/ 74    Allergies: ACE INHIBITORS, ARB-ANGIOTENSIN RECEPTOR ANTAGONIST    Sonographer:  Breanna Cuevas [facility]  Group:  [facility]  Referring Physician:  Ja Faulkner MD  Reading Physician:  Darryl Millard. Jesse Lara MD [facility]    INDICATIONS: CVA    *Limited parasternal views obtained from the subcostal window due to poor  visualization of the parasternal window. Limited 2-d measurements obtained   due  to limited visualization. PROCEDURE: This was a routine study. A transthoracic echocardiogram was  performed. The study included complete 2D imaging, M-mode, complete spectral  Doppler, and color Doppler. Intravenous contrast (agitated saline, 9 ml) was  administered to evaluate possible R-L intracardiac shunting. This was a  technically difficult study. LEFT VENTRICLE: Size was normal. Systolic function was normal. Ejection  fraction was estimated in the range of 60 % to 65 %. There were no regional  wall motion abnormalities. Wall thickness was normal. Left ventricular  diastolic function parameters were normal. E/e' avg: 10.70. RIGHT VENTRICLE: The size was normal. Systolic function was normal.    LEFT ATRIUM: Size was normal.    ATRIAL SEPTUM: Contrast injection was performed. There was no right-to-left  shunt, with provocative maneuvers to increase right atrial pressure. RIGHT ATRIUM: Size was normal.    SYSTEMIC VEINS: IVC: The inferior vena cava was not well visualized. AORTIC VALVE: The valve was structurally normal, tri-commissural. There was   no  evidence for stenosis. There was no insufficiency. MITRAL VALVE: Valve structure was normal. There was no evidence for stenosis. There was no regurgitation.     TRICUSPID VALVE: The valve structure was normal. There was no evidence for  stenosis. There was no regurgitation. PULMONIC VALVE: The valve structure was normal. There was no evidence for  stenosis. There was no insufficiency. PERICARDIUM: There was no pericardial effusion. AORTA: The root exhibited normal size. SUMMARY:    -  Left ventricle: Systolic function was normal. Ejection fraction was  estimated in the range of 60 % to 65 %. There were no regional wall motion  abnormalities. -  Atrial septum: Contrast injection was performed. There was no   right-to-left  shunt, with provocative maneuvers to increase right atrial pressure. SYSTEM MEASUREMENT TABLES    2D  Ao Diam: 3.5 cm  LAEDV Index (A-L): 19.6 ml/m2  LVOT Diam: 2 cm    Prepared and signed by    Edilia Jimenez. Anni Maciel MD [facility]  Signed 20-Jul-2019 16:44:28         Procedures done this admission:  * No surgery found *    All Micro Results     None          Labs: Results:       BMP, Mg, Phos Recent Labs     07/23/19  1342 07/22/19  0501    146*   K 3.3* 3.8   * 110*   CO2 24 26   AGAP 10 10   BUN 11 12   CREA 0.85 0.78*   CA 8.3 8.3   * 146*      CBC Recent Labs     07/23/19  1342 07/22/19  0501   WBC 11.0 13.1*   RBC 4.51 4.15*   HGB 14.3 13.1*   HCT 41.1 38.1*    220   GRANS 90* 92*   LYMPH 5* 4*   EOS 0* 0*   MONOS 4 4   BASOS 0 0   IG 1 0   ANEU 10.0* 12.1*   ABL 0.5 0.5   KAREN 0.0 0.0   ABM 0.4 0.5   ABB 0.0 0.0   AIG 0.1 0.0      LFT No results for input(s): SGOT, ALT, TBIL, AP, TP, ALB, GLOB, AGRAT, GPT in the last 72 hours.    Cardiac Testing Lab Results   Component Value Date/Time    Troponin-I, Qt. 0.02 03/18/2016 07:20 PM      Coagulation Tests Lab Results   Component Value Date/Time    Prothrombin time 13.9 07/19/2019 10:59 PM    Prothrombin time 10.6 04/23/2013 09:30 AM    INR 1.1 07/19/2019 10:59 PM    INR 1.0 04/23/2013 09:30 AM    aPTT 27.8 04/23/2013 09:30 AM      A1c No results found for: HBA1C, HGBE8, [id number]   Lipid Panel Lab Results   Component Value Date/Time    Cholesterol, total 124 06/24/2019 08:44 AM    HDL Cholesterol 45 06/24/2019 08:44 AM    LDL, calculated 59 06/24/2019 08:44 AM    VLDL Cholesterol 20 05/07/2013 01:21 PM    VLDL, calculated 20 06/24/2019 08:44 AM    Triglyceride 100 06/24/2019 08:44 AM      Thyroid Panel Lab Results   Component Value Date/Time    TSH 3.840 (H) 07/19/2019 10:59 PM    TSH 2.810 10/31/2016 10:26 AM        Most Recent UA Lab Results   Component Value Date/Time    Color Yellow 03/12/2018 09:17 AM    Appearance Clear 03/12/2018 09:17 AM    pH (UA) 6.5 03/12/2018 09:17 AM    Protein NEGATIVE  12/23/2013 01:35 PM    Glucose NEGATIVE  12/23/2013 01:35 PM    Ketone Negative 03/12/2018 09:17 AM    Bilirubin Negative 03/12/2018 09:17 AM    Blood Negative 03/12/2018 09:17 AM    Urobilinogen 0.2 12/23/2013 01:35 PM    Nitrites Negative 03/12/2018 09:17 AM    Leukocyte Esterase Trace (A) 03/12/2018 09:17 AM    WBC 0-5 03/12/2018 09:17 AM    RBC 0-2 03/12/2018 09:17 AM    Epithelial cells 0-10 03/12/2018 09:17 AM    Bacteria Few 03/12/2018 09:17 AM    Casts 3-5 HYALINE 02/20/2014 11:40 AM    Crystals, urine 0 12/23/2013 01:35 PM    Mucus Present 03/12/2018 09:17 AM        Allergies   Allergen Reactions    Ace Inhibitors Angioedema    Arb-Angiotensin Receptor Antagonist Angioedema     Immunization History   Administered Date(s) Administered    Influenza High Dose Vaccine PF 10/24/2015, 09/10/2017, 09/25/2018    Influenza Vaccine 01/01/2012    Pneumococcal Conjugate (PCV-13) 12/15/2016    Pneumococcal Polysaccharide (PPSV-23) 05/02/2012    Pneumococcal Vaccine (Unspecified Type) 05/02/2012    TB Skin Test (PPD) Intradermal 05/11/2013, 01/10/2017, 07/20/2019       All Labs from Last 24 Hrs:  Recent Results (from the past 24 hour(s))   CBC WITH AUTOMATED DIFF    Collection Time: 07/23/19  1:42 PM   Result Value Ref Range    WBC 11.0 4.3 - 11.1 K/uL    RBC 4.51 4.23 - 5.6 M/uL    HGB 14.3 13.6 - 17.2 g/dL    HCT 41.1 41.1 - 50.3 % MCV 91.1 79.6 - 97.8 FL    MCH 31.7 26.1 - 32.9 PG    MCHC 34.8 31.4 - 35.0 g/dL    RDW 13.3 11.9 - 14.6 %    PLATELET 974 326 - 289 K/uL    MPV 10.3 9.4 - 12.3 FL    ABSOLUTE NRBC 0.00 0.0 - 0.2 K/uL    DF AUTOMATED      NEUTROPHILS 90 (H) 43 - 78 %    LYMPHOCYTES 5 (L) 13 - 44 %    MONOCYTES 4 4.0 - 12.0 %    EOSINOPHILS 0 (L) 0.5 - 7.8 %    BASOPHILS 0 0.0 - 2.0 %    IMMATURE GRANULOCYTES 1 0.0 - 5.0 %    ABS. NEUTROPHILS 10.0 (H) 1.7 - 8.2 K/UL    ABS. LYMPHOCYTES 0.5 0.5 - 4.6 K/UL    ABS. MONOCYTES 0.4 0.1 - 1.3 K/UL    ABS. EOSINOPHILS 0.0 0.0 - 0.8 K/UL    ABS. BASOPHILS 0.0 0.0 - 0.2 K/UL    ABS. IMM.  GRANS. 0.1 0.0 - 0.5 K/UL   METABOLIC PANEL, BASIC    Collection Time: 07/23/19  1:42 PM   Result Value Ref Range    Sodium 142 136 - 145 mmol/L    Potassium 3.3 (L) 3.5 - 5.1 mmol/L    Chloride 108 (H) 98 - 107 mmol/L    CO2 24 21 - 32 mmol/L    Anion gap 10 7 - 16 mmol/L    Glucose 207 (H) 65 - 100 mg/dL    BUN 11 8 - 23 MG/DL    Creatinine 0.85 0.8 - 1.5 MG/DL    GFR est AA >60 >60 ml/min/1.73m2    GFR est non-AA >60 >60 ml/min/1.73m2    Calcium 8.3 8.3 - 10.4 MG/DL       Current Med List in Hospital:   Current Facility-Administered Medications   Medication Dose Route Frequency    dexAMETHasone (DECADRON) tablet 4 mg  4 mg Oral Q6H    famotidine (PEPCID) tablet 20 mg  20 mg Oral BID    levETIRAcetam (KEPPRA) oral solution 500 mg  500 mg Oral BID    alcohol 62% (NOZIN) nasal  1 Ampule  1 Ampule Topical Q12H    metoprolol (LOPRESSOR) injection 2.5 mg  2.5 mg IntraVENous Q4H PRN    oxyCODONE IR (ROXICODONE) tablet 5 mg  5 mg Oral Q4H PRN    simvastatin (ZOCOR) tablet 20 mg  20 mg Oral 7am    sodium chloride (NS) flush 5-40 mL  5-40 mL IntraVENous Q8H    sodium chloride (NS) flush 5-40 mL  5-40 mL IntraVENous PRN    acetaminophen (TYLENOL) tablet 650 mg  650 mg Oral Q4H PRN    ondansetron (ZOFRAN) injection 4 mg  4 mg IntraVENous Q4H PRN    0.9% sodium chloride infusion  75 mL/hr IntraVENous CONTINUOUS    LORazepam (ATIVAN) injection 1 mg  1 mg IntraVENous Q2H PRN    haloperidol lactate (HALDOL) injection 5 mg  5 mg IntraVENous Q4H PRN       Discharge Exam:  Patient Vitals for the past 24 hrs:   Temp Pulse Resp BP SpO2   07/24/19 0731 97.9 °F (36.6 °C) 77 18 159/89 95 %   07/24/19 0403 97.7 °F (36.5 °C) (!) 56 18 151/85 94 %   07/24/19 0029 97.8 °F (36.6 °C) 64 18 180/85 91 %   07/23/19 2019 98 °F (36.7 °C) 60 19 (!) 163/92 94 %   07/23/19 1504 98.6 °F (37 °C) 61 18 139/81 94 %   07/23/19 1145 97.3 °F (36.3 °C) (!) 53 17 164/82 91 %     Oxygen Therapy  O2 Sat (%): 95 % (07/24/19 0731)  Pulse via Oximetry: 76 beats per minute (07/21/19 1500)  O2 Device: Room air (07/21/19 1230)    Intake/Output Summary (Last 24 hours) at 7/24/2019 0850  Last data filed at 7/24/2019 1654  Gross per 24 hour   Intake    Output 2150 ml   Net -2150 ml       *Note that automatically entered I/Os may not be accurate; dependent on patient compliance with collection and accurate  by assistants. General:    Lethargic, elderly, no distress   CV:   Regular rate and rhythm. No murmur, rub, or gallop. No edema  Lungs:  Clear to auscultation bilaterally. No wheezing, rhonchi, or rales. Anterior exam  Abdomen: Soft, nontender, nondistended. Bowel sounds normal.   Extremities: Warm and dry  Skin:     No rashes or jaundice. Discharge Info:   Current Discharge Medication List      START taking these medications    Details   levETIRAcetam (KEPPRA) 100 mg/ml soln oral solution Take 5 mL by mouth two (2) times a day. Qty: 300 mL, Refills: 0      dexAMETHasone (DECADRON) 4 mg tablet Take 4 mg by mouth every six (6) hours. Qty: 120 Tab, Refills: 0         CONTINUE these medications which have CHANGED    Details   oxyCODONE IR (ROXICODONE) 5 mg immediate release tablet Take 1 Tab by mouth every four (4) hours as needed for Pain for up to 7 days.  Max Daily Amount: 30 mg.  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 Qty: 180 Tab, Refills: 3    Associated Diagnoses: Essential hypertension      raNITIdine (ZANTAC) 150 mg tablet Take 150 mg by mouth daily. fluticasone (CUTIVASE) 0.005 % ointment Apply  to affected area two (2) times a day. apply thin layer as directed      triamcinolone acetonide (KENALOG) 0.1 % topical cream Apply  to affected area two (2) times a day. use thin layer         STOP taking these medications       methotrexate (RHEUMATREX) 2.5 mg tablet Comments:   Reason for Stopping:         cetirizine (ZYRTEC) 10 mg tablet Comments:   Reason for Stopping:         simvastatin (ZOCOR) 20 mg tablet Comments:   Reason for Stopping:         potassium chloride SR (KLOR-CON 10) 10 mEq tablet Comments:   Reason for Stopping:         hydroCHLOROthiazide (HYDRODIURIL) 25 mg tablet Comments:   Reason for Stopping:         clopidogrel (PLAVIX) 75 mg tab Comments:   Reason for Stopping:         aspirin 81 mg tablet Comments:   Reason for Stopping: Follow Up Orders: Follow-up Appointments   Procedures    FOLLOW UP VISIT Appointment in: Other (Specify)     Standing Status:   Standing     Number of Occurrences:   1     Order Specific Question:   Appointment in     Answer: Other (Specify)       Follow-up Information     Follow up With Specialties Details Why Contact Info    Dania Leroy MD Internal Medicine   [address] [address]  [phone]            Time spent in patient discharge planning and coordination 30 minutes.     Signed:  Theron Burch MD

## 2019-07-24 NOTE — PROGRESS NOTES
Discharge instructions and prescriptions given and reviewed with pt, verbalizes understanding, pt discharged home with family. Transport to  at 1300.

## 2019-07-24 NOTE — DISCHARGE INSTRUCTIONS
DISCHARGE SUMMARY from Nurse    PATIENT INSTRUCTIONS:    After general anesthesia or intravenous sedation, for 24 hours or while taking prescription Narcotics:  · Limit your activities  · Do not drive and operate hazardous machinery  · Do not make important personal or business decisions  · Do  not drink alcoholic beverages  · If you have not urinated within 8 hours after discharge, please contact your surgeon on call. Report the following to your surgeon:  · Excessive pain, swelling, redness or odor of or around the surgical area  · Temperature over 100.5  · Nausea and vomiting lasting longer than 4 hours or if unable to take medications  · Any signs of decreased circulation or nerve impairment to extremity: change in color, persistent  numbness, tingling, coldness or increase pain  · Any questions    What to do at Home:  Recommended activity: per patient's ability    If you experience any of the following symptoms per Hospice MD and RN, please follow up with per hospice. *  Please give a list of your current medications to your Primary Care Provider. *  Please update this list whenever your medications are discontinued, doses are      changed, or new medications (including over-the-counter products) are added. *  Please carry medication information at all times in case of emergency situations. These are general instructions for a healthy lifestyle:    No smoking/ No tobacco products/ Avoid exposure to second hand smoke  Surgeon General's Warning:  Quitting smoking now greatly reduces serious risk to your health.     Obesity, smoking, and sedentary lifestyle greatly increases your risk for illness    A healthy diet, regular physical exercise & weight monitoring are important for maintaining a healthy lifestyle    You may be retaining fluid if you have a history of heart failure or if you experience any of the following symptoms:  Weight gain of 3 pounds or more overnight or 5 pounds in a week, increased swelling in our hands or feet or shortness of breath while lying flat in bed. Please call your doctor as soon as you notice any of these symptoms; do not wait until your next office visit. The discharge information has been reviewed with the caregiver. The caregiver verbalized understanding. Discharge medications reviewed with the caregiver and appropriate educational materials and side effects teaching were provided. ___________________________________________________________________________________________________________________________________    Patient Education        Learning About Hospice and Palliative Care  What are hospice and palliative care? Palliative (say \"PAL-amilcar-uh-tiv\") care is an area of medicine that helps give you more good days by providing care for quality-of-life issues. It includes treating symptoms like pain, nausea, or sleep problems. It can also include helping you and your loved ones to:  · Understand your illness better. · Talk more openly about your feelings. · Decide what treatments you want or don't want. · Communicate better with your doctors, nurses, and each other. Hospice care is a type of palliative care. But it's for people who are near the end of life. What kinds of care are involved? Palliative care: This treatment helps you feel better physically, emotionally, and spiritually while doctors also treat your illness. Your care may include pain relief, counseling, or nutrition advice. Hospice care: Again, the goal of this type of care is to help you feel better. And it can help you get the most out of the time you have left. But you no longer get treatment to try to cure your illness. When does care happen? Palliative care: This care can happen at any time during a serious illness. You don't have to be near death to get this care. Hospice care:  In most cases, you can choose hospice care when your doctor believes that you have no more than about 6 months to live. Where does the care happen? Palliative care: This care often happens in hospitals or long-term care facilities like nursing homes. It can take place wherever you are treated, even in your home. Hospice care: Most hospice care is done in the place the patient calls \"home. \" This is often the person's home. But it could also be a place like a nursing home or [facility] center. Hospice care may also be given in hospice centers, hospitals, and other places. Who provides the care? Palliative care: There are doctors and nurses who specialize in this field. But your own doctor may also give some of this care. And there are many other experts who may help you. These include social workers, counselors, therapists, and nutrition experts. Hospice care: In hospitals, hospice centers, and other facilities, care is given by doctors, nurses, and others who are trained in hospice care. In the home, a family member is often the main caregiver. But the family member gets help from care experts. They are on call 24 hours a day. Where can you learn more? Go to http://georgie-martha.info/. Enter [id number] in the search box to learn more about \"Learning About Hospice and Palliative Care. \"  Current as of: April 1, 2019  Content Version: 12.1  © 9740-1766 [employer]. Care instructions adapted under license by [employer] (which disclaims liability or warranty for this information). If you have questions about a medical condition or this instruction, always ask your healthcare professional. [place] [address] any warranty or liability for your use of this information.

## 2019-07-24 NOTE — PROGRESS NOTES
07/24/19 1801   NIH Stroke Scale   Interval Other (comment)  (dual assessment w/ Maria C Gill RN)   LOC 0   LOC Questions 0   LOC Commands 0   Best Gaze 0   Visual 0   Facial Palsy 1   Motor Right Arm 0   Motor Left Arm 0   Motor Right Leg 0   Motor Left Leg 1   Limb Ataxia 0   Sensory 1   Best Language 0   Dysarthria 0   Extinction and Inattention 0   Total 3

## 2019-07-24 NOTE — PROGRESS NOTES
Problem: Falls - Risk of  Goal: *Absence of Falls  Description  Document [facility] Fall Risk and appropriate interventions in the flowsheet. Outcome: Progressing Towards Goal  Note:   Fall Risk Interventions:  Mobility Interventions: Bed/chair exit alarm, Patient to call before getting OOB    Mentation Interventions: Bed/chair exit alarm, Reorient patient, Increase mobility, Door open when patient unattended    Medication Interventions: Patient to call before getting OOB, Bed/chair exit alarm, Teach patient to arise slowly    Elimination Interventions: Call light in reach, Bed/chair exit alarm, Patient to call for help with toileting needs, Toileting schedule/hourly rounds    History of Falls Interventions: Bed/chair exit alarm, Door open when patient unattended         Problem: Patient Education: Go to Patient Education Activity  Goal: Patient/Family Education  Outcome: Progressing Towards Goal     Problem: Dysphagia (Adult)  Goal: Interventions  Outcome: Progressing Towards Goal     Problem: Patient Education: Go to Patient Education Activity  Goal: Patient/Family Education  Outcome: Progressing Towards Goal     Problem: Pressure Injury - Risk of  Goal: *Prevention of pressure injury  Description  Document Selvin Scale and appropriate interventions in the flowsheet.   Outcome: Progressing Towards Goal  Note:   Pressure Injury Interventions:  Sensory Interventions: Assess changes in LOC, Keep linens dry and wrinkle-free, Minimize linen layers, Pressure redistribution bed/mattress (bed type)    Moisture Interventions: Minimize layers, Limit adult briefs, Apply protective barrier, creams and emollients    Activity Interventions: Pressure redistribution bed/mattress(bed type), Increase time out of bed    Mobility Interventions: Pressure redistribution bed/mattress (bed type)    Nutrition Interventions: Document food/fluid/supplement intake, Offer support with meals,snacks and hydration    Friction and Shear Interventions: Foam dressings/transparent film/skin sealants, HOB 30 degrees or less, Lift sheet, Lift team/patient mobility team, Minimize layers                Problem: Patient Education: Go to Patient Education Activity  Goal: Patient/Family Education  Outcome: Progressing Towards Goal     Problem: Delirium Treatment  Goal: *Level of consciousness restored to baseline  Outcome: Progressing Towards Goal  Goal: *Level of environmental perceptions restored to baseline  Outcome: Progressing Towards Goal  Goal: *Sensory perception restored to baseline  Outcome: Progressing Towards Goal  Goal: *Emotional stability restored to baseline  Outcome: Progressing Towards Goal  Goal: *Functional assessment restored to baseline  Outcome: Progressing Towards Goal  Goal: *Absence of falls  Outcome: Progressing Towards Goal  Goal: *Will remain free of delirium, CAM Score negative  Outcome: Progressing Towards Goal  Goal: *Cognitive status will be restored to baseline  Outcome: Progressing Towards Goal  Goal: Interventions  Outcome: Progressing Towards Goal     Problem: Patient Education: Go to Patient Education Activity  Goal: Patient/Family Education  Outcome: Progressing Towards Goal     Problem: Patient Education: Go to Patient Education Activity  Goal: Patient/Family Education  Outcome: Progressing Towards Goal     Problem: Patient Education: Go to Patient Education Activity  Goal: Patient/Family Education  Outcome: Progressing Towards Goal

## 2019-07-30 PROBLEM — R47.01 EXPRESSIVE APHASIA: Status: ACTIVE | Noted: 2019-01-01

## 2019-07-30 PROBLEM — R13.19 DYSPHAGIA CAUSING PULMONARY ASPIRATION WITH SWALLOWING: Status: ACTIVE | Noted: 2019-01-01

## 2019-07-30 PROBLEM — G93.9 BRAIN STEM LESION: Status: ACTIVE | Noted: 2019-01-01

## 2019-07-30 NOTE — PROGRESS NOTES
Progress Note    Patient: Andrew Schilling MRN: [id number]  SSN: [id number]    YOB: 1935  Age: 82 y.o. Sex: male      Admit Date: (Not on file)    LOS: 0 days     Subjective: This joint visit was undertaken with David Tapia RN,  for the purpose of establishing myself as primary physician under hospice as well as to evaluate and treat the patient's symptom burden. After experiencing dysarthria and ataxia in early July, this 82-year-old man sought evaluation at [facility] emergency room.  Brain imaging studies demonstrated a 3.5 cm para-pontine basal ganglion lesion and a 2.5 cm right frontal lobe lesion.  Both of these lesions had accompanying vascular edema and intralesional hemorrhage.  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  The deep location of both lesions makes attempts at radiotherapy more arduous. Patient did not wish to undergo invasive biopsy.  He and his family have chosen instead to limit further care to comfort measures only.  His life expectancy under these circumstances significantly less than 3 months.  With the emergence of petite mal seizures anticonvulsant therapy with Keppra was undertaken.  The patient is now experiencing issues with dysphagia and delayed swallowing as well as increasing confusion, gross ataxia which have confined him to bed. Hernandez catheter has been placed for retention of urine.     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      Social Hx:He'll be cared for at home by his 3 adult daughters. 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  [facility] will assist in palliation of symptoms   Review of Systems:  Pertinent items are noted in the History of Present Illness. The patient's daughter reports she is having headaches the patient gestures to his brow upon hearing this. He has become incontinent of stool and is having infrequent large volume semiliquid evacuation. Daughter also reports increased emotional lability since initiation of Decadron Rx. Objective:     Vitals:    07/30/19 1902   BP: 110/74   Pulse: (!) 52        Intake and Output:  Current Shift: No intake/output data recorded. Last three shifts: No intake/output data recorded. Physical Exam:thin, pale, alert but nonverbal white man with extensive  Actinic keratoses and chronic solar skin changes of the scalp and upper torso, arms and legs. Right frontal crown scalp. He has a viable skin flap in a roughly circular area of surgical excision that is healthy. Urine appearance than the nearby scalp. It is slightly recessed. He has no cervical adenopathy. No carotid bruits or goiter. Oropharynx is free of exudate. Extraocular motion is normal.  Pupils are reactive. Facial expression is symmetric. He coughs frequently during the exam.  Lung fields are clear low tidal volumes poor. He hasn't emphysematous body habitus. Heart tones are distant but regular and slow. See vitals. There is no MRG. Abdomen is scaphoid. Bowel sounds are present. There is no focal tenderness, organomegaly. Hernandez catheter is in place without urethral irritation. He is draining troponin levels in the last 24 hours. Extremities show bilateral upgoing plantar reflex. Light touch is intact. 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 Lab/Data Review:  No new labs resulted in the last 24 hours.       Assessment:   1.)  The patient has had a strikingly rapid clinical decline from mild ataxia and delayed swallowing in June 2 his present bedbound and mute state. Family reports she is sleeping 16 more hours per day and is unable to manage regular consistencies due to cheeking of foods. He does seem to manage pureed food  With minimal aspiration. He cannot manage a straw, but is able to drink from a cup and we recommend the use of a sippy cup. His seizure disorder is well controlled and somnolence is not due to Keppra rather progression of disease with increased intracranial pressure. Rate of functional decline predicts he will not survive more than a month. I had discussion about prognosis with his daughter and slight removed from bedside. The patient's wife has moderately severe Alzheimer's disease and has a waxing and waning understanding of her 's prognosis. 2.). We will decrease carvedilol dose from 6.25-3. 125 mg twice daily. The headaches and bradycardia suggest increased  intracranial pressure. I will therefore increase Decadron from 4 mg twice daily to 6 mg twice daily. Ranitidine is in place as a GI prophylaxis against gastritis. 3.)rrecommended to the caregiving daughter that she be slightly more liberal in the use of OxyIR 5 mg by mouth every 4 hours when necessary headache. It's difficult to assess the patient's level of discomfort. Given his relative stoicism and expressive aphasia, recommended erring on the side of over rather than under treating pain. 4.)  Pattern of bowel movements suggests possibility of slow by diarrhea and a high impaction. No stool could be followed examining finger per admitting nurse. Continue senna and encourage hydration. Patient's urine output in excess of thousand ML's per 24 hours suggests that hydration is fair. 5.)  No evidence of dysphoria or hallucination delirium. Nausea has been quiescent. We will continue to make Haldol available should these emerged. 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   Patient has been taken off Plavix and aspirin. This exposes him to the risk of thrombosis in his coronary stents. This adversely affects prognosis, but cannot be addressed. Families been made aware of the possibility of sarcoid cardiac death or recurrent hemorrhage. Plan:     Current Outpatient Medications   Medication Sig Dispense    levETIRAcetam (KEPPRA) 100 mg/ml soln oral solution Take 5 mL by mouth two (2) times a day. 300 mL    dexAMETHasone (DECADRON) 4 mg tablet Take 4 mg by mouth every six (6) hours. 120 Tab    oxyCODONE IR (ROXICODONE) 5 mg immediate release tablet Take 1 Tab by mouth every four (4) hours as needed for Pain for up to 7 days. Max Daily Amount: 30 mg. 15 Tab    dexAMETHasone (DECADRON) 4 mg tablet Take 6 mg by mouth two (2) times daily (with meals). Indications: fluid accumulation in the brain     levETIRAcetam (KEPPRA) 100 mg/mL solution Take 500 mg by mouth two (2) times a day.  carvedilol (COREG) 6.25 mg tablet Take 3.125 mg by mouth two (2) times daily (with meals).  senna (SENNA) 8.6 mg tablet Take 1 Tab by mouth two (2) times a day. Indications: constipation     acetaminophen (TYLENOL) 500 mg tablet Take 500 mg by mouth every six (6) hours as needed for Fever or Pain.  haloperidol (HALDOL) 2 mg/mL oral concentrate Take 1 mg by mouth every four (4) hours as needed for Other (agitation).  oxyCODONE IR (ROXICODONE) 5 mg immediate release tablet Take 5 mg by mouth every four (4) hours as needed for Pain.  triamcinolone acetonide (KENALOG) 0.1 % topical cream Apply 1 Tube to affected area two (2) times daily as needed for Skin Irritation.  raNITIdine (ZANTAC) 15 mg/mL syrup Take 150 mg by mouth two (2) times a day.  carvedilol (COREG) 6.25 mg tablet Take 1 Tab by mouth two (2) times daily (with meals). 180 Tab    raNITIdine (ZANTAC) 150 mg tablet Take 150 mg by mouth daily.  fluticasone (CUTIVASE) 0.005 % ointment Apply  to affected area two (2) times a day.  apply thin layer as directed     triamcinolone acetonide (KENALOG) 0.1 % topical cream Apply  to affected area two (2) times a day. use thin layer      No current facility-administered medications for this visit. Please see impression and plan above.     Signed By: Adri Valadez MD     July 30, 2019

## 2019-08-23 ENCOUNTER — HOME CARE VISIT (OUTPATIENT)
Dept: HOSPICE | Facility: HOSPICE | Age: 84
End: 2019-08-23
Payer: MEDICARE

## 2019-08-24 VITALS — RESPIRATION RATE: 20 BRPM | HEART RATE: 136 BPM
